# Patient Record
Sex: MALE | Race: ASIAN | URBAN - METROPOLITAN AREA
[De-identification: names, ages, dates, MRNs, and addresses within clinical notes are randomized per-mention and may not be internally consistent; named-entity substitution may affect disease eponyms.]

---

## 2017-06-21 ENCOUNTER — HOSPITAL ENCOUNTER (EMERGENCY)
Facility: CLINIC | Age: 32
Discharge: HOME | End: 2017-06-21
Attending: EMERGENCY MEDICINE | Admitting: EMERGENCY MEDICINE
Payer: COMMERCIAL

## 2017-06-21 VITALS
SYSTOLIC BLOOD PRESSURE: 146 MMHG | RESPIRATION RATE: 18 BRPM | DIASTOLIC BLOOD PRESSURE: 98 MMHG | HEIGHT: 70 IN | TEMPERATURE: 97.9 F | WEIGHT: 180 LBS | OXYGEN SATURATION: 99 % | HEART RATE: 94 BPM | BODY MASS INDEX: 25.77 KG/M2

## 2017-06-21 DIAGNOSIS — G43.809 OTHER MIGRAINE WITHOUT STATUS MIGRAINOSUS, NOT INTRACTABLE: ICD-10-CM

## 2017-06-21 PROCEDURE — 25000128 H RX IP 250 OP 636: Performed by: EMERGENCY MEDICINE

## 2017-06-21 PROCEDURE — 96374 THER/PROPH/DIAG INJ IV PUSH: CPT

## 2017-06-21 PROCEDURE — 96375 TX/PRO/DX INJ NEW DRUG ADDON: CPT

## 2017-06-21 PROCEDURE — 96361 HYDRATE IV INFUSION ADD-ON: CPT

## 2017-06-21 PROCEDURE — 99285 EMERGENCY DEPT VISIT HI MDM: CPT | Mod: 25

## 2017-06-21 PROCEDURE — 25000125 ZZHC RX 250: Performed by: EMERGENCY MEDICINE

## 2017-06-21 PROCEDURE — 96376 TX/PRO/DX INJ SAME DRUG ADON: CPT

## 2017-06-21 PROCEDURE — 25000308 HC RX OP HPI UCR WEL MED 250 IP 250: Performed by: EMERGENCY MEDICINE

## 2017-06-21 RX ORDER — DIHYDROERGOTAMINE MESYLATE 1 MG/ML
1 INJECTION, SOLUTION INTRAMUSCULAR; INTRAVENOUS; SUBCUTANEOUS ONCE
Status: COMPLETED | OUTPATIENT
Start: 2017-06-21 | End: 2017-06-21

## 2017-06-21 RX ORDER — SODIUM CHLORIDE 9 MG/ML
1000 INJECTION, SOLUTION INTRAVENOUS CONTINUOUS
Status: DISCONTINUED | OUTPATIENT
Start: 2017-06-21 | End: 2017-06-21 | Stop reason: HOSPADM

## 2017-06-21 RX ORDER — LIDOCAINE 40 MG/G
CREAM TOPICAL
Status: DISCONTINUED | OUTPATIENT
Start: 2017-06-21 | End: 2017-06-21 | Stop reason: HOSPADM

## 2017-06-21 RX ORDER — DIPHENHYDRAMINE HYDROCHLORIDE 50 MG/ML
50 INJECTION INTRAMUSCULAR; INTRAVENOUS ONCE
Status: COMPLETED | OUTPATIENT
Start: 2017-06-21 | End: 2017-06-21

## 2017-06-21 RX ORDER — KETOROLAC TROMETHAMINE 30 MG/ML
30 INJECTION, SOLUTION INTRAMUSCULAR; INTRAVENOUS ONCE
Status: COMPLETED | OUTPATIENT
Start: 2017-06-21 | End: 2017-06-21

## 2017-06-21 RX ORDER — LIDOCAINE HYDROCHLORIDE 40 MG/ML
1 INJECTION, SOLUTION RETROBULBAR ONCE
Status: COMPLETED | OUTPATIENT
Start: 2017-06-21 | End: 2017-06-21

## 2017-06-21 RX ORDER — LORAZEPAM 2 MG/ML
1 INJECTION INTRAMUSCULAR ONCE
Status: COMPLETED | OUTPATIENT
Start: 2017-06-21 | End: 2017-06-21

## 2017-06-21 RX ORDER — DEXAMETHASONE SODIUM PHOSPHATE 10 MG/ML
10 INJECTION, SOLUTION INTRAMUSCULAR; INTRAVENOUS ONCE
Status: COMPLETED | OUTPATIENT
Start: 2017-06-21 | End: 2017-06-21

## 2017-06-21 RX ADMIN — DEXAMETHASONE SODIUM PHOSPHATE 10 MG: 10 INJECTION, SOLUTION INTRAMUSCULAR; INTRAVENOUS at 14:37

## 2017-06-21 RX ADMIN — DIPHENHYDRAMINE HYDROCHLORIDE 50 MG: 50 INJECTION, SOLUTION INTRAMUSCULAR; INTRAVENOUS at 14:29

## 2017-06-21 RX ADMIN — PROCHLORPERAZINE EDISYLATE 10 MG: 5 INJECTION INTRAMUSCULAR; INTRAVENOUS at 14:31

## 2017-06-21 RX ADMIN — SODIUM CHLORIDE 1000 ML: 9 INJECTION, SOLUTION INTRAVENOUS at 14:28

## 2017-06-21 RX ADMIN — SODIUM CHLORIDE 1000 ML: 9 INJECTION, SOLUTION INTRAVENOUS at 15:47

## 2017-06-21 RX ADMIN — LIDOCAINE HYDROCHLORIDE 1 ML: 40 INJECTION, SOLUTION RETROBULBAR; TOPICAL at 17:07

## 2017-06-21 RX ADMIN — DIHYDROERGOTAMINE MESYLATE 1 MG: 1 INJECTION, SOLUTION INTRAMUSCULAR; INTRAVENOUS; SUBCUTANEOUS at 14:34

## 2017-06-21 RX ADMIN — LIDOCAINE HYDROCHLORIDE 1 ML: 10 INJECTION, SOLUTION EPIDURAL; INFILTRATION; INTRACAUDAL; PERINEURAL at 17:03

## 2017-06-21 RX ADMIN — LORAZEPAM 1 MG: 2 INJECTION INTRAMUSCULAR; INTRAVENOUS at 15:47

## 2017-06-21 RX ADMIN — DIHYDROERGOTAMINE MESYLATE 1 MG: 1 INJECTION, SOLUTION INTRAMUSCULAR; INTRAVENOUS; SUBCUTANEOUS at 15:47

## 2017-06-21 RX ADMIN — KETOROLAC TROMETHAMINE 30 MG: 30 INJECTION, SOLUTION INTRAMUSCULAR at 14:41

## 2017-06-21 ASSESSMENT — ENCOUNTER SYMPTOMS
PHOTOPHOBIA: 1
VOMITING: 0
HEADACHES: 1
NAUSEA: 0
FEVER: 0

## 2017-06-21 NOTE — ED AVS SNAPSHOT
Emergency Department    [address] [facility] [address]    Phone:  [phone]    Fax:  [phone]                                       Syd Renee   MRN: [id number]    Department:   Emergency Department   Date of Visit:  6/21/2017           After Visit Summary Signature Page     I have received my discharge instructions, and my questions have been answered. I have discussed any challenges I see with this plan with the nurse or doctor.    ..........................................................................................................................................  Patient/Patient Representative Signature      ..........................................................................................................................................  Patient Representative Print Name and Relationship to Patient    ..................................................               ................................................  Date                                            Time    ..........................................................................................................................................  Reviewed by Signature/Title    ...................................................              ..............................................  Date                                                            Time

## 2017-06-21 NOTE — DISCHARGE INSTRUCTIONS
Home, rest, try an ice pack to the head.  Recheck in the clinic this week with the neurologist this week or early next week.  If you get worse, return to the ER.        Migraines and Cluster Headaches  Migraines and cluster headaches cause intense, throbbing pain on one side of the head. With a migraine, you may have nausea and vomiting and be sensitive to light and sound. You may also have warning signs, such as flashing lights or loss of parts of your vision, before the pain starts. Migraines are three times more common in women than men. This may be due to hormonal changes during menstruation. Typical migrains may last for 4 to 72 hours untreated.  Cluster headaches recur in groups for days, weeks, or months. The pain is centered around or behind one eye. The eye may also become red or teary, or the eyelid may droop. Migraines and cluster headaches can have many triggers.    Preventing migraines and cluster headaches  Try the following steps:    Avoid aged cheeses, nuts, beans, chocolate, red wine, or foods that contain caffeine, alcohol, tobacco, nitrates, and MSG.    Try not to skip meals.    Don t work in poor lighting.    Reduce stress as much as you can.    Get plenty of sleep each night.    Exercise regularly under your doctor s guidance.    Avoid taking headache medicines for more than 3 days, because of the risk of rebound headaches.  Relieving the pain  Try these suggestions:    Stay quiet and rest.    Use cold to numb the pain. Wrap ice or a cold can of soda in a cloth. Hold it against the site of pain for 10 minutes. Repeat every 20 minutes.    Avoid light. Wear dark glasses, turn out lights, and close the curtains. When outdoors, wear a brimmed hat.    Drink lots of fluids. Sip caffeine-free flat soda to help relieve nausea.    See your doctor if you get migraines or cluster headaches often. There are effective medications to help treat or prevent them.    Hormone therapy. This may help women whose  migraines are related to hormonal changes during menstruation.  Date Last Reviewed: 10/19/2015    8138-3350 [employer]. [address] [place] [address], [place] [address]. All rights reserved. This information is not intended as a substitute for professional medical care. Always follow your healthcare professional's instructions.

## 2017-06-21 NOTE — ED PROVIDER NOTES
"  History     Chief Complaint:  Headache    HPI   Syd Renee is a 31 year old male who presents with a 9/10 headache. Patient reports he has had similar headaches before, most recently 2 years ago. He has seen neurology, and reports being diagnosed with cluster headaches. Patient's current headache began 2 days ago. It is a \"pulsating\" headache, and hurts worst on the front right and back right sides. The patient has taken tylenol with no relief. He is having a little trouble with light. No trauma to the head. Denies fevers, visual disturbances, or nausea/vomiting.     Allergies:  The patient has no known drug allergies.    Medications:    Norco  Ibuprofen  Ceftin  Phenergan    Past Medical History:    Cluster headaches    Past Surgical History:    History reviewed.  No significant past surgical history.     Family History:    History reviewed.  No significant family history.    Social History:  Relationship status:   Tobacco use: Negative  Alcohol use: Positive  The patient presents with his wife.     Review of Systems   Constitutional: Negative for fever.   Eyes: Positive for photophobia. Negative for visual disturbance.   Gastrointestinal: Negative for nausea and vomiting.   Neurological: Positive for headaches.   All other systems reviewed and are negative.      Physical Exam   First Vitals:  BP: (!) 157/95  Pulse: 95  Temp: 97.9  F (36.6  C)  Resp: 20  Height: 177.8 cm (5' 10\")  Weight: 81.6 kg (180 lb)  SpO2: 96 %    Physical Exam  Nursing note and vitals reviewed.  Constitutional:  Appears well-developed and well-nourished, comfortable.   HENT:    Neck is supple.  Head:   No evidence of facial or scalp trauma.  Nose:    Nose normal.   Mouth/Throat:  Mucosa is moist.  Eyes:    Conjunctivae are normal.      Pupils are equal, round, and reactive to light.      Right eye exhibits no discharge. Left eye exhibits no discharge.      No scleral icterus.   Cardiovascular:  Normal rate, regular rhythm. "      Normal heart sounds and intact distal pulses.       No murmur heard.  Pulmonary/Chest:  Effort normal and breath sounds normal. No respiratory distress.     No wheezes. No rales. No chest wall tenderness. No stridor.   Abdominal:   Soft. No distension and no mass. No tenderness.      No rebound and no guarding. No flank pain.  Musculoskeletal:  Normal range of motion.      No edema and no tenderness.                                       Neck supple, no midline cervical tenderness.   Neurological:   Alert and oriented to person, place, and year.      No cranial nerve deficit.      Exhibits normal muscle tone. Coordination normal.      GCS eye subscore is 4. GCS verbal subscore is 5.      GCS motor subscore is 6.      No hand drift. Good  strength.  Skin:    Skin is warm and dry. No rash noted. No diaphoresis.      No erythema. No pallor.   Psychiatric:   Behavior is normal. Judgment and thought content normal.       Emergency Department Course     Interventions:  1428: Normal Saline, 1000 mL, IV  1429: Benadryl, 50 mg, IV injection  1431: Compazine, 10 mg, IV  1434: DHE, 1 mg, IV  1437: Decadron, 10 mg, IV injection  1441: Toradol, 30 mg, IV injection  1547: DHE, 1 mg, IV  1547: Ativan, 1.0 mg, IV injection  1547: Normal Saline, 1000 mL, IV  1707: Lidocaine injection, 1 mL, Nasal    Emergency Department Course:  Nursing notes and vitals reviewed.  I performed an exam of the patient as documented above.  The above workup was undertaken.  1520: I rechecked the patient.  1610: I rechecked the patient.    Findings and plan explained to the Patient. Patient discharged home, status improved, with instructions regarding supportive care, medications, and reasons to return as well as the importance of close follow-up was reviewed.      Impression & Plan      Medical Decision Making:  Syd Renee is a 31 year old male who comes in with what he calls a cluster headache, though he does not have the rhinorrhea, or the  eye watering, or sweating. I suspect this is probably a vascular headache, like a migraine. IV was placed. He was given fluids, and multiple medications. He was given Toradol, decadron, benadryl, compazine, and DHE IV. After an hour, his headache was down to 6-7. He was given a second dose of DHE 1 mg IV, and 1 mg of ativan. After another 45-60 minutes, his headache was down to 2-3. He feels much better and is ready to go home. I want him to get into neurology. At this point, he will go home and rest. He will call the clinic to be seen. If he gets significantly worse, he is welcome to return to the ED. No history of trauma, no fevers, no reason for further headache workup. It is consistent with his previous vascular headaches.     Diagnosis:    ICD-10-CM   1. Other migraine without status migrainosus, not intractable G43.809     Disposition:  Discharge to home with neurology follow up. Home, rest, try an ice pack to the head.  Recheck in the clinic this week with the neurologist this week or early next week.  If you get worse, return to the ER.    I, Clark Hernandez, am serving as a scribe on 6/21/2017 at 2:07 PM to personally document services performed by Teresa Carson MD, based on my observations and the provider's statements to me.     EMERGENCY DEPARTMENT       Teresa Carson MD  06/21/17 7836

## 2017-06-24 ENCOUNTER — HOSPITAL ENCOUNTER (EMERGENCY)
Facility: CLINIC | Age: 32
Discharge: HOME | End: 2017-06-24
Attending: EMERGENCY MEDICINE | Admitting: EMERGENCY MEDICINE
Payer: COMMERCIAL

## 2017-06-24 VITALS
SYSTOLIC BLOOD PRESSURE: 142 MMHG | HEIGHT: 70 IN | RESPIRATION RATE: 20 BRPM | BODY MASS INDEX: 25.77 KG/M2 | OXYGEN SATURATION: 100 % | TEMPERATURE: 98.6 F | WEIGHT: 180 LBS | DIASTOLIC BLOOD PRESSURE: 90 MMHG | HEART RATE: 80 BPM

## 2017-06-24 DIAGNOSIS — G44.001 INTRACTABLE CLUSTER HEADACHE SYNDROME, UNSPECIFIED CHRONICITY PATTERN: ICD-10-CM

## 2017-06-24 PROCEDURE — 25000128 H RX IP 250 OP 636: Performed by: EMERGENCY MEDICINE

## 2017-06-24 PROCEDURE — 96361 HYDRATE IV INFUSION ADD-ON: CPT

## 2017-06-24 PROCEDURE — 96374 THER/PROPH/DIAG INJ IV PUSH: CPT

## 2017-06-24 PROCEDURE — 99284 EMERGENCY DEPT VISIT MOD MDM: CPT | Mod: 25

## 2017-06-24 PROCEDURE — 96375 TX/PRO/DX INJ NEW DRUG ADDON: CPT

## 2017-06-24 RX ORDER — DIPHENHYDRAMINE HYDROCHLORIDE 50 MG/ML
25 INJECTION INTRAMUSCULAR; INTRAVENOUS ONCE
Status: COMPLETED | OUTPATIENT
Start: 2017-06-24 | End: 2017-06-24

## 2017-06-24 RX ORDER — DEXAMETHASONE SODIUM PHOSPHATE 10 MG/ML
6 INJECTION, SOLUTION INTRAMUSCULAR; INTRAVENOUS ONCE
Status: COMPLETED | OUTPATIENT
Start: 2017-06-24 | End: 2017-06-24

## 2017-06-24 RX ORDER — KETOROLAC TROMETHAMINE 15 MG/ML
15 INJECTION, SOLUTION INTRAMUSCULAR; INTRAVENOUS ONCE
Status: COMPLETED | OUTPATIENT
Start: 2017-06-24 | End: 2017-06-24

## 2017-06-24 RX ORDER — METOCLOPRAMIDE HYDROCHLORIDE 5 MG/ML
10 INJECTION INTRAMUSCULAR; INTRAVENOUS ONCE
Status: COMPLETED | OUTPATIENT
Start: 2017-06-24 | End: 2017-06-24

## 2017-06-24 RX ORDER — PREDNISONE 20 MG/1
TABLET ORAL
Qty: 10 TABLET | Refills: 0 | Status: SHIPPED | OUTPATIENT
Start: 2017-06-24

## 2017-06-24 RX ADMIN — DEXAMETHASONE SODIUM PHOSPHATE 6 MG: 10 INJECTION, SOLUTION INTRAMUSCULAR; INTRAVENOUS at 09:56

## 2017-06-24 RX ADMIN — METOCLOPRAMIDE 10 MG: 5 INJECTION, SOLUTION INTRAMUSCULAR; INTRAVENOUS at 09:57

## 2017-06-24 RX ADMIN — DIPHENHYDRAMINE HYDROCHLORIDE 25 MG: 50 INJECTION, SOLUTION INTRAMUSCULAR; INTRAVENOUS at 09:55

## 2017-06-24 RX ADMIN — SODIUM CHLORIDE 1000 ML: 9 INJECTION, SOLUTION INTRAVENOUS at 09:53

## 2017-06-24 RX ADMIN — KETOROLAC TROMETHAMINE 15 MG: 15 INJECTION, SOLUTION INTRAMUSCULAR; INTRAVENOUS at 09:59

## 2017-06-24 ASSESSMENT — ENCOUNTER SYMPTOMS
DIZZINESS: 0
HEADACHES: 1
WEAKNESS: 0
NECK PAIN: 1
NECK STIFFNESS: 0
FEVER: 0
PHOTOPHOBIA: 1
NUMBNESS: 0
GASTROINTESTINAL NEGATIVE: 1
CHILLS: 0

## 2017-06-24 NOTE — ED PROVIDER NOTES
"  History     Chief Complaint:  Headache     HPI   Syd Renee is a 31 year old male with a history of cluster headaches who presents for evaluation of a one week history of a right fronto-temporal and occipital headache. This is exactly reminiscent of prior cluster headache he had in 2015, at which time he had unremarkable MRI/MRA studies, and is associated with mild photophobia and insomnia. The patient was seen here three days ago for this same headache where he had improvement with interventions while in the ED, but recurrence after discharge (see Dr. Carson's note on 6/21/17 for further details). He has an appointment scheduled with Dr. Kwok of Neurology in two days, but his headache has been intractable since his previous visit. He reports improvement with oral steroids two years ago with initial headaches, and requests these again today. He does endorse mild neck pain without neck stiffness. The patient denies any focal numbness or weakness, nausea or vomiting, recent falls or head trauma, neck stiffness, fever or chills, rash, recent tick/insect bites, or any other acute symptoms. Headache is currently \"10/10\" and not improved with acetaminophen at home.       Allergies:  NKDA    Medications:    Norco  ibuprofen  Ceftin  Phenergan     Past Medical History:    Cluster headaches    Past Surgical History:    History reviewed. No pertinent surgical history.     Family History:    History reviewed. No pertinent family history.      Social History:  The patient denies tobacco, alcohol, or drug use.    Marital Status:   [2]       Review of Systems   Constitutional: Negative for chills and fever.   Eyes: Positive for photophobia.   Gastrointestinal: Negative.    Musculoskeletal: Positive for neck pain. Negative for neck stiffness.   Neurological: Positive for headaches. Negative for dizziness, weakness and numbness.   All other systems reviewed and are negative.      Physical Exam     Patient Vitals for " "the past 24 hrs:   BP Temp Temp src Pulse Heart Rate Resp SpO2 Height Weight   17 1052 142/90 - - 80 - 20 100 % - -   17 1030 (!) 143/91 - - - - - 100 % - -   17 1015 146/86 - - - - - 100 % - -   17 0936 (!) 144/119 98.6  F (37  C) Oral - 83 12 97 % 1.778 m (5' 10\") 81.6 kg (180 lb)      Physical Exam  General: Patient is alert and normal appearing.  HEENT: Head atraumatic    Eyes: pupils equal and reactive. Conjunctiva clear   Nares: patent   Oropharynx: no lesions, uvula midline, no palatal draping, normal voice, no trismus  Neck: Supple without lymphadenopathy, no meningismus  Chest: Heart regular rate and rhythm.   Lungs: Equal clear to auscultation with no wheeze or rales  Abdomen: Soft, non tender, nondistended, normal bowel sounds  Back: No costovertebral angle tenderness, no midline C, T or L spine tenderness  Neuro: Grossly nonfocal, normal speech, strength equal bilaterally, CN 2-12 intact, normal finger to nose, no pronator drift  Extremities: No deformities, equal radial and DP pulses. No clubbing, cyanosis.  No edema  Skin: Warm and dry with no rash.       Emergency Department Course   Interventions:  0953: Normal Saline 0.9% 1L, IV   0955: diphenhydramine 25mg,IV  0956: dexamethasone 6mg, IV  0957: metoclopramide 10mg, IV  0959: ketorolac 15mg, IV    Emergency Department Course:  Past medical records, nursing notes, and vitals reviewed.  0942: I performed an exam of the patient as documented above. GCS 15.   IV access established. The patient was given the above interventions with improvement.   1030: Rechecked patient. His headache is now 4/10 and much improved.   Clinical findings and plan explained to the Patient and significant other. Patient discharged home with instructions regarding supportive care, medications, and reasons to return as well as the importance of close follow-up were reviewed.      Impression & Plan    Medical Decision Makin year old male with a " history of cluster headache -- last was two years ago and felt identical to this headache. Started one week ago today. He was seen here on Wednesday and had improvement in headache that day and was able to sleep, but the last few nights have been severe pain. He states it is identical severe headaches and not improved with tylenol at home. He had an MRI last two years ago. He has had no fevers or chills. He has had some neck pain but no stiffness. No tick bite exposures. I do not suspect meningitis at this point. I do not feel this is consistent with acute subarachnoid hemorrhage. I do not feel that new neuro imaging or LP are necessary at this time. He was given headache cocktail again today with Toradol, benadryl, Reglan, and decadron with significant improvement in his headache. He is recommended to follow up with Dr. Kwok with Neurology on Monday as already scheduled on Monday to further discuss this headache. Had this been a new or different headache I would have elected to do further workup but he, again, admits that this is identical.  He is really here requesting prednisone as this is what helped his headache the last time. He does not wish for CT imaging at this time and understands the possibility of missed CNS lesion.  Headache was gradual in onset, has been present over one week and does not seem consistent with SAH or meningitis at this time clinically. The patient's questions and concerns were addressed. He has had no recent head injury to suggest intracranial injury. Vitals are within normal limits. The patient is agreeable to management plan, return precautions to the ER were reviewed.   Critical Care time:  none    Diagnosis:    ICD-10-CM    1. Intractable cluster headache syndrome, unspecified chronicity pattern G44.001        Disposition:  discharged to home    Discharge Medications:  Discharge Medication List as of 6/24/2017 10:47 AM      START taking these medications    Details   predniSONE  (DELTASONE) 20 MG tablet Take two tablets (= 40mg) each day for 5 (five) days, Disp-10 tablet, R-0, Local Print               Nino Clifton  6/24/2017    EMERGENCY DEPARTMENT       Winter Richmond MD  06/24/17 5424

## 2017-06-24 NOTE — ED AVS SNAPSHOT
Emergency Department    [address] [facility] [address]    Phone:  [phone]    Fax:  [phone]                                       Syd Renee   MRN: [id number]    Department:   Emergency Department   Date of Visit:  6/24/2017           Patient Information     Date Of Birth          1985        Your diagnoses for this visit were:     Intractable cluster headache syndrome, unspecified chronicity pattern        You were seen by Winter Richmond MD.      Follow-up Information     Follow up with Kaur Grewal MD.    Specialty:  Neurology    Why:  as scheduled on Monday    Contact information:    [facility]  [address] [address] [phone]          Follow up with  Emergency Department.    Specialty:  EMERGENCY MEDICINE    Why:  If symptoms worsen, fever, neck stiffness or other concerns    Contact information:    [address] [facility] [id number] [phone]        Discharge Instructions         Cluster Headache  A cluster headache is different from a migraine or a tension headache. A cluster headache starts suddenly, without any warning. The pain can become severe within minutes. The headache is often brief. It can last only 15 minutes. But it can also continue for several hours.  The pain is around a single eye. You may have tears coming from that eye. That eyelid may become droopy. The eye may be red and swollen. You may also have a stuffy or runny nose on the affected side.  You may also have several headaches in one 24-hour period. These may return at the same time of day during the cluster period. A cluster headache ends as suddenly as it began. It often leaves you feeling exhausted for some time afterward.  Cluster headaches often occur at night and during certain times of the year that are unique to you. A cluster period ranges from a few weeks up to a few months. Then, the headaches may not come back  for months or years.  Possible triggers include alcohol and smoking. Even a single alcoholic drink can trigger a headache during the cluster period. Another potential trigger is interrupted sleep patterns. Medicines like nitroglycerin can also cause a cluster headache.  Treatment for cluster headaches varies. Headaches that last only 15 minutes aren t helped by over-the-counter medicines. That s because these medicines take 20 to 30 minutes to start working. Prescription medicines given by injection or as a nasal spray can stop a headache. This is called abortive treatment.  Preventive medicines include steroids and anti-seizure medicines. These can help cut the number of headaches you have during a cluster period. High dose oxygen therapy or a nerve stimulator may shorten each attack and make it less severe. If you have cluster headaches often or have severe symptoms, your healthcare provider may talk with you about surgery. Surgery may be able to stop the nerve that s sending the pain signals. These types of treatments are usually given by a specialist (neurologist or neurosurgeon).  Home care  Follow these tips when caring for yourself at home:    Don t drive yourself home if you were given pain medicine for your headache. Instead, have someone else drive you home. Try to sleep when you get home. You should feel much better when you wake up.    If your healthcare provider gave you preventive medicines, take them as directed. If abortive medicines were prescribed, carry these with you to take at first sign of the headache.    Stick to a regular sleep schedule. Avoid afternoon naps during a cluster period. If you have sleep apnea, talk with your provider about getting treatment for this condition. Sleep apnea greatly interferes with sleep patterns.    Stay away from gas fumes and oil-based paint fumes.    Avoid drinking alcohol and smoking during a cluster period.    Be cautious when traveling to high altitudes.  Higher altitudes have less oxygen. This may trigger a headache.    Use sunglasses to avoid glare and bright lights.    Keep a headache journal. Record the date and time of each headache. Describe the quality of the pain. Note how severe it is, where it is, and how long it lasts. Write down your response to any medicines to control the pain. Note possible triggers: Was it something you ate? Something you did just before the attack? Share this information with your provider to help him or her figure out the best treatment plan for you.    Talk with a counselor or therapist, or join a support group. This may help you cope with the effects of cluster headache on your life.  Follow-up care  Follow up with your healthcare provider, or as advised If you had a CT scan or an MRI, a specialist will review it. You will be told of any new findings that may affect your care.  When to seek medical advice  Call your healthcare provider right away if any of these occur:    Sudden, severe headache, worse than any you have had before    Headache with a fainting spell    Unexplained fever greater than 100.0  F (37.8  C), or as advised    Stiff neck or rash    Weakness in an arm or leg, or on one side of your face    Difficulty speaking    Headaches brought on by physical activity    Changes in your vision    You need to use more pain medicine than normal  Date Last Reviewed: 8/1/2016 2000-2017 [employer]. [address] [place], [place] [address]. All rights reserved. This information is not intended as a substitute for professional medical care. Always follow your healthcare professional's instructions.          24 Hour Appointment Hotline       To make an appointment at any [facility] clinic, call [phone] ([phone]). If you don't have a family doctor or clinic, we will help you find one. [facility] clinics are conveniently located to serve the needs of you and your family.             Review of your  medicines      START taking        Dose / Directions Last dose taken    predniSONE 20 MG tablet   Commonly known as:  DELTASONE   Quantity:  10 tablet        Take two tablets (= 40mg) each day for 5 (five) days   Refills:  0          Our records show that you are taking the medicines listed below. If these are incorrect, please call your family doctor or clinic.        Dose / Directions Last dose taken    cefuroxime 250 MG/5ML suspension   Commonly known as:  CEFTIN   Dose:  500 mg        Take 500 mg by mouth 2 times daily   Refills:  0        HYDROcodone-acetaminophen 5-325 MG per tablet   Commonly known as:  NORCO   Dose:  1-2 tablet   Quantity:  15 tablet        Take 1-2 tablets by mouth every 4 hours as needed for pain   Refills:  0        IBUPROFEN PO        Refills:  0        promethazine 25 MG tablet   Commonly known as:  PHENERGAN   Dose:  12.5 mg   Quantity:  10 tablet        Take 0.5 tablets (12.5 mg) by mouth every 6 hours as needed for nausea or other (headache)   Refills:  1                Prescriptions were sent or printed at these locations (1 Prescription)                   Other Prescriptions                Printed at Department/Unit printer (1 of 1)         predniSONE (DELTASONE) 20 MG tablet                Orders Needing Specimen Collection     None      Pending Results     No orders found from 6/22/2017 to 6/25/2017.            Pending Culture Results     No orders found from 6/22/2017 to 6/25/2017.            Pending Results Instructions     If you had any lab results that were not finalized at the time of your Discharge, you can call the ED Lab Result RN at [phone]. You will be contacted by this team for any positive Lab results or changes in treatment. The nurses are available 7 days a week from 10A to 6:30P.  You can leave a message 24 hours per day and they will return your call.        Test Results From Your Hospital Stay               Clinical Quality Measure: Blood Pressure Screening  "    Your blood pressure was checked while you were in the emergency department today. The last reading we obtained was  BP: (!) 143/91 . Please read the guidelines below about what these numbers mean and what you should do about them.  If your systolic blood pressure (the top number) is less than 120 and your diastolic blood pressure (the bottom number) is less than 80, then your blood pressure is normal. There is nothing more that you need to do about it.  If your systolic blood pressure (the top number) is 120-139 or your diastolic blood pressure (the bottom number) is 80-89, your blood pressure may be higher than it should be. You should have your blood pressure rechecked within a year by a primary care provider.  If your systolic blood pressure (the top number) is 140 or greater or your diastolic blood pressure (the bottom number) is 90 or greater, you may have high blood pressure. High blood pressure is treatable, but if left untreated over time it can put you at risk for heart attack, stroke, or kidney failure. You should have your blood pressure rechecked by a primary care provider within the next 4 weeks.  If your provider in the emergency department today gave you specific instructions to follow-up with your doctor or provider even sooner than that, you should follow that instruction and not wait for up to 4 weeks for your follow-up visit.        Thank you for choosing [facility]       Thank you for choosing [facility] for your care. Our goal is always to provide you with excellent care. Hearing back from our patients is one way we can continue to improve our services. Please take a few minutes to complete the written survey that you may receive in the mail after you visit with us. Thank you!        [employer] Information     [employer] lets you send messages to your doctor, view your test results, renew your prescriptions, schedule appointments and more. To sign up, go to [email or web address] . Click on \"Log in\" " "on the left side of the screen, which will take you to the Welcome page. Then click on \"Sign up Now\" on the right side of the page.     You will be asked to enter the access code listed below, as well as some personal information. Please follow the directions to create your username and password.     Your access code is: [email or web address]  Expires: 2017  4:15 PM     Your access code will  in 90 days. If you need help or a new code, please call your [facility] clinic or [phone].        Care EveryWhere ID     This is your Care EveryWhere ID. This could be used by other organizations to access your [facility] medical records  [id number]        Equal Access to Services     PATRICIA WHITLOCK : Yenny Weaver, akil rapp, emilio doshi, gaurav milligan. So [facility] [phone].    ATENCIÓN: Si habla español, tiene a parr disposición servicios gratuitos de asistencia lingüística. Llame al [phone].    We comply with applicable federal civil rights laws and [place] laws. We do not discriminate on the basis of race, color, national origin, age, disability sex, sexual orientation or gender identity.            After Visit Summary       This is your record. Keep this with you and show to your community pharmacist(s) and doctor(s) at your next visit.                  "

## 2017-06-24 NOTE — ED AVS SNAPSHOT
Emergency Department    [address] [facility] [address]    Phone:  [phone]    Fax:  [phone]                                       Syd Renee   MRN: [id number]    Department:   Emergency Department   Date of Visit:  6/24/2017           After Visit Summary Signature Page     I have received my discharge instructions, and my questions have been answered. I have discussed any challenges I see with this plan with the nurse or doctor.    ..........................................................................................................................................  Patient/Patient Representative Signature      ..........................................................................................................................................  Patient Representative Print Name and Relationship to Patient    ..................................................               ................................................  Date                                            Time    ..........................................................................................................................................  Reviewed by Signature/Title    ...................................................              ..............................................  Date                                                            Time

## 2017-06-24 NOTE — DISCHARGE INSTRUCTIONS
Cluster Headache  A cluster headache is different from a migraine or a tension headache. A cluster headache starts suddenly, without any warning. The pain can become severe within minutes. The headache is often brief. It can last only 15 minutes. But it can also continue for several hours.  The pain is around a single eye. You may have tears coming from that eye. That eyelid may become droopy. The eye may be red and swollen. You may also have a stuffy or runny nose on the affected side.  You may also have several headaches in one 24-hour period. These may return at the same time of day during the cluster period. A cluster headache ends as suddenly as it began. It often leaves you feeling exhausted for some time afterward.  Cluster headaches often occur at night and during certain times of the year that are unique to you. A cluster period ranges from a few weeks up to a few months. Then, the headaches may not come back for months or years.  Possible triggers include alcohol and smoking. Even a single alcoholic drink can trigger a headache during the cluster period. Another potential trigger is interrupted sleep patterns. Medicines like nitroglycerin can also cause a cluster headache.  Treatment for cluster headaches varies. Headaches that last only 15 minutes aren t helped by over-the-counter medicines. That s because these medicines take 20 to 30 minutes to start working. Prescription medicines given by injection or as a nasal spray can stop a headache. This is called abortive treatment.  Preventive medicines include steroids and anti-seizure medicines. These can help cut the number of headaches you have during a cluster period. High dose oxygen therapy or a nerve stimulator may shorten each attack and make it less severe. If you have cluster headaches often or have severe symptoms, your healthcare provider may talk with you about surgery. Surgery may be able to stop the nerve that s sending the pain  signals. These types of treatments are usually given by a specialist (neurologist or neurosurgeon).  Home care  Follow these tips when caring for yourself at home:    Don t drive yourself home if you were given pain medicine for your headache. Instead, have someone else drive you home. Try to sleep when you get home. You should feel much better when you wake up.    If your healthcare provider gave you preventive medicines, take them as directed. If abortive medicines were prescribed, carry these with you to take at first sign of the headache.    Stick to a regular sleep schedule. Avoid afternoon naps during a cluster period. If you have sleep apnea, talk with your provider about getting treatment for this condition. Sleep apnea greatly interferes with sleep patterns.    Stay away from gas fumes and oil-based paint fumes.    Avoid drinking alcohol and smoking during a cluster period.    Be cautious when traveling to high altitudes. Higher altitudes have less oxygen. This may trigger a headache.    Use sunglasses to avoid glare and bright lights.    Keep a headache journal. Record the date and time of each headache. Describe the quality of the pain. Note how severe it is, where it is, and how long it lasts. Write down your response to any medicines to control the pain. Note possible triggers: Was it something you ate? Something you did just before the attack? Share this information with your provider to help him or her figure out the best treatment plan for you.    Talk with a counselor or therapist, or join a support group. This may help you cope with the effects of cluster headache on your life.  Follow-up care  Follow up with your healthcare provider, or as advised If you had a CT scan or an MRI, a specialist will review it. You will be told of any new findings that may affect your care.  When to seek medical advice  Call your healthcare provider right away if any of these occur:    Sudden, severe headache, worse  than any you have had before    Headache with a fainting spell    Unexplained fever greater than 100.0  F (37.8  C), or as advised    Stiff neck or rash    Weakness in an arm or leg, or on one side of your face    Difficulty speaking    Headaches brought on by physical activity    Changes in your vision    You need to use more pain medicine than normal  Date Last Reviewed: 8/1/2016 2000-2017 [employer]. [address] [place] [address], [place] [address]. All rights reserved. This information is not intended as a substitute for professional medical care. Always follow your healthcare professional's instructions.

## 2017-06-25 ENCOUNTER — APPOINTMENT (OUTPATIENT)
Dept: CT IMAGING | Facility: CLINIC | Age: 32
End: 2017-06-25
Attending: EMERGENCY MEDICINE
Payer: COMMERCIAL

## 2017-06-25 ENCOUNTER — HOSPITAL ENCOUNTER (EMERGENCY)
Facility: CLINIC | Age: 32
Discharge: HOME | End: 2017-06-25
Attending: EMERGENCY MEDICINE | Admitting: EMERGENCY MEDICINE
Payer: COMMERCIAL

## 2017-06-25 VITALS
TEMPERATURE: 98.4 F | RESPIRATION RATE: 16 BRPM | DIASTOLIC BLOOD PRESSURE: 70 MMHG | BODY MASS INDEX: 25.77 KG/M2 | HEIGHT: 70 IN | OXYGEN SATURATION: 100 % | WEIGHT: 180 LBS | SYSTOLIC BLOOD PRESSURE: 127 MMHG

## 2017-06-25 DIAGNOSIS — G44.011 INTRACTABLE EPISODIC CLUSTER HEADACHE: ICD-10-CM

## 2017-06-25 LAB
ANION GAP SERPL CALCULATED.3IONS-SCNC: 8 MMOL/L (ref 3–14)
APPEARANCE CSF: CLEAR
BASOPHILS # BLD AUTO: 0 10E9/L (ref 0–0.2)
BASOPHILS NFR BLD AUTO: 0.1 %
BUN SERPL-MCNC: 8 MG/DL (ref 7–30)
CALCIUM SERPL-MCNC: 9.3 MG/DL (ref 8.5–10.1)
CHLORIDE SERPL-SCNC: 103 MMOL/L (ref 94–109)
CO2 SERPL-SCNC: 25 MMOL/L (ref 20–32)
COLOR CSF: COLORLESS
CREAT SERPL-MCNC: 0.84 MG/DL (ref 0.66–1.25)
CRP SERPL-MCNC: <2.9 MG/L (ref 0–8)
DIFFERENTIAL METHOD BLD: ABNORMAL
EOSINOPHIL # BLD AUTO: 0 10E9/L (ref 0–0.7)
EOSINOPHIL NFR BLD AUTO: 0 %
ERYTHROCYTE [DISTWIDTH] IN BLOOD BY AUTOMATED COUNT: 13.3 % (ref 10–15)
ERYTHROCYTE [SEDIMENTATION RATE] IN BLOOD BY WESTERGREN METHOD: 2 MM/H (ref 0–15)
GFR SERPL CREATININE-BSD FRML MDRD: ABNORMAL ML/MIN/1.7M2
GLUCOSE CSF-MCNC: 78 MG/DL (ref 40–70)
GLUCOSE SERPL-MCNC: 115 MG/DL (ref 70–99)
GRAM STN SPEC: NORMAL
HCT VFR BLD AUTO: 48.2 % (ref 40–53)
HGB BLD-MCNC: 17.4 G/DL (ref 13.3–17.7)
IMM GRANULOCYTES # BLD: 0.1 10E9/L (ref 0–0.4)
IMM GRANULOCYTES NFR BLD: 0.5 %
INR PPP: 1.02 (ref 0.86–1.14)
LYMPHOCYTES # BLD AUTO: 0.8 10E9/L (ref 0.8–5.3)
LYMPHOCYTES NFR BLD AUTO: 5.4 %
MCH RBC QN AUTO: 29.2 PG (ref 26.5–33)
MCHC RBC AUTO-ENTMCNC: 36.1 G/DL (ref 31.5–36.5)
MCV RBC AUTO: 81 FL (ref 78–100)
MICRO REPORT STATUS: NORMAL
MONOCYTES # BLD AUTO: 0.6 10E9/L (ref 0–1.3)
MONOCYTES NFR BLD AUTO: 4 %
NEUTROPHILS # BLD AUTO: 13.3 10E9/L (ref 1.6–8.3)
NEUTROPHILS NFR BLD AUTO: 90 %
NRBC # BLD AUTO: 0 10*3/UL
NRBC BLD AUTO-RTO: 0 /100
PLATELET # BLD AUTO: 465 10E9/L (ref 150–450)
POTASSIUM SERPL-SCNC: 4 MMOL/L (ref 3.4–5.3)
PROT CSF-MCNC: 23 MG/DL (ref 15–60)
RBC # BLD AUTO: 5.96 10E12/L (ref 4.4–5.9)
RBC # CSF MANUAL: 0 /UL (ref 0–2)
SODIUM SERPL-SCNC: 136 MMOL/L (ref 133–144)
SPECIMEN SOURCE: NORMAL
TUBE # CSF: 4 #
WBC # BLD AUTO: 14.8 10E9/L (ref 4–11)
WBC # CSF MANUAL: 0 /UL (ref 0–5)

## 2017-06-25 PROCEDURE — 80048 BASIC METABOLIC PNL TOTAL CA: CPT | Performed by: EMERGENCY MEDICINE

## 2017-06-25 PROCEDURE — 70450 CT HEAD/BRAIN W/O DYE: CPT

## 2017-06-25 PROCEDURE — 86140 C-REACTIVE PROTEIN: CPT | Performed by: EMERGENCY MEDICINE

## 2017-06-25 PROCEDURE — 25000128 H RX IP 250 OP 636: Performed by: EMERGENCY MEDICINE

## 2017-06-25 PROCEDURE — 87205 SMEAR GRAM STAIN: CPT | Performed by: EMERGENCY MEDICINE

## 2017-06-25 PROCEDURE — 86617 LYME DISEASE ANTIBODY: CPT | Performed by: EMERGENCY MEDICINE

## 2017-06-25 PROCEDURE — 85610 PROTHROMBIN TIME: CPT | Performed by: EMERGENCY MEDICINE

## 2017-06-25 PROCEDURE — 99285 EMERGENCY DEPT VISIT HI MDM: CPT | Mod: 25

## 2017-06-25 PROCEDURE — 87070 CULTURE OTHR SPECIMN AEROBIC: CPT | Performed by: EMERGENCY MEDICINE

## 2017-06-25 PROCEDURE — 25000125 ZZHC RX 250: Performed by: EMERGENCY MEDICINE

## 2017-06-25 PROCEDURE — 85652 RBC SED RATE AUTOMATED: CPT | Performed by: EMERGENCY MEDICINE

## 2017-06-25 PROCEDURE — 96365 THER/PROPH/DIAG IV INF INIT: CPT

## 2017-06-25 PROCEDURE — 85025 COMPLETE CBC W/AUTO DIFF WBC: CPT | Performed by: EMERGENCY MEDICINE

## 2017-06-25 PROCEDURE — 87529 HSV DNA AMP PROBE: CPT | Performed by: EMERGENCY MEDICINE

## 2017-06-25 PROCEDURE — 89050 BODY FLUID CELL COUNT: CPT | Performed by: EMERGENCY MEDICINE

## 2017-06-25 PROCEDURE — 96375 TX/PRO/DX INJ NEW DRUG ADDON: CPT

## 2017-06-25 PROCEDURE — 96361 HYDRATE IV INFUSION ADD-ON: CPT

## 2017-06-25 PROCEDURE — 62270 DX LMBR SPI PNXR: CPT

## 2017-06-25 PROCEDURE — 82945 GLUCOSE OTHER FLUID: CPT | Performed by: EMERGENCY MEDICINE

## 2017-06-25 PROCEDURE — 84157 ASSAY OF PROTEIN OTHER: CPT | Performed by: EMERGENCY MEDICINE

## 2017-06-25 RX ORDER — HALOPERIDOL 5 MG/ML
2 INJECTION INTRAMUSCULAR ONCE
Status: COMPLETED | OUTPATIENT
Start: 2017-06-25 | End: 2017-06-25

## 2017-06-25 RX ORDER — HYDROCODONE BITARTRATE AND ACETAMINOPHEN 5; 325 MG/1; MG/1
1-2 TABLET ORAL EVERY 4 HOURS PRN
Qty: 15 TABLET | Refills: 0 | Status: ON HOLD | OUTPATIENT
Start: 2017-06-25 | End: 2017-10-05

## 2017-06-25 RX ORDER — DIPHENHYDRAMINE HYDROCHLORIDE 50 MG/ML
25 INJECTION INTRAMUSCULAR; INTRAVENOUS ONCE
Status: COMPLETED | OUTPATIENT
Start: 2017-06-25 | End: 2017-06-25

## 2017-06-25 RX ORDER — METOCLOPRAMIDE HYDROCHLORIDE 5 MG/ML
10 INJECTION INTRAMUSCULAR; INTRAVENOUS ONCE
Status: COMPLETED | OUTPATIENT
Start: 2017-06-25 | End: 2017-06-25

## 2017-06-25 RX ORDER — LIDOCAINE HYDROCHLORIDE 40 MG/ML
10 SOLUTION TOPICAL ONCE
Status: COMPLETED | OUTPATIENT
Start: 2017-06-25 | End: 2017-06-25

## 2017-06-25 RX ORDER — ONDANSETRON 2 MG/ML
4 INJECTION INTRAMUSCULAR; INTRAVENOUS EVERY 30 MIN PRN
Status: DISCONTINUED | OUTPATIENT
Start: 2017-06-25 | End: 2017-06-25 | Stop reason: HOSPADM

## 2017-06-25 RX ADMIN — DIPHENHYDRAMINE HYDROCHLORIDE 25 MG: 50 INJECTION, SOLUTION INTRAMUSCULAR; INTRAVENOUS at 11:59

## 2017-06-25 RX ADMIN — HALOPERIDOL LACTATE 2 MG: 5 INJECTION, SOLUTION INTRAMUSCULAR at 14:03

## 2017-06-25 RX ADMIN — VALPROATE SODIUM 500 MG: 100 INJECTION, SOLUTION INTRAVENOUS at 14:24

## 2017-06-25 RX ADMIN — LIDOCAINE HYDROCHLORIDE 10 ML: 40 SOLUTION TOPICAL at 14:00

## 2017-06-25 RX ADMIN — HYDROMORPHONE HYDROCHLORIDE 1 MG: 1 INJECTION, SOLUTION INTRAMUSCULAR; INTRAVENOUS; SUBCUTANEOUS at 15:28

## 2017-06-25 RX ADMIN — ONDANSETRON 4 MG: 2 SOLUTION INTRAMUSCULAR; INTRAVENOUS at 11:55

## 2017-06-25 RX ADMIN — METOCLOPRAMIDE 10 MG: 5 INJECTION, SOLUTION INTRAMUSCULAR; INTRAVENOUS at 11:57

## 2017-06-25 RX ADMIN — SODIUM CHLORIDE 1000 ML: 9 INJECTION, SOLUTION INTRAVENOUS at 11:52

## 2017-06-25 ASSESSMENT — ENCOUNTER SYMPTOMS
EYE DISCHARGE: 1
HEADACHES: 1

## 2017-06-25 NOTE — ED PROVIDER NOTES
"  History     Chief Complaint:  Headache    HPI   Syd Renee is a 31 year old male who presents with a headache. The patient has a history of cluster headaches, and has been seen twice this week with severe headaches. He came here yesterday and was given IV medications, with improvement of his symptoms. Last night before bed, however, his headache returned. It began with mild pain on his right forehead and right shoulder, and gradually worsened. He now reports stabbing pains on the right side of his head. He has had some tearing from his right eye. He took tylenol this morning with no relief.     Allergies:  The patient has no known drug allergies.    Medications:    Deltasone  Norco  Ceftin  Ibuprofen  Phenergan     Past Medical History:    Cluster headaches    Past Surgical History:    History reviewed.  No significant past surgical history.    Family History:    History reviewed.  No significant family history.    Social History:  Relationship status:   Tobacco use: Negative  Alcohol use: Positive  The patient presents with his wife.     Review of Systems   Eyes: Positive for discharge.   Neurological: Positive for headaches.   All other systems reviewed and are negative.      Physical Exam   First Vitals:  BP: 149/82  Heart Rate: 94  Temp: 98.4  F (36.9  C)  Resp: 16  Height: 177.8 cm (5' 10\")  Weight: 81.6 kg (180 lb)  SpO2: 99 %    Physical Exam  General: patient is alert and uncomfortable appearing.  HEENT: Head atraumatic    Eyes: pupils equal and reactive. Conjunctiva clear   Nares: patent   Oropharynx: no lesions, uvula midline, no palatal draping, normal voice, no trismus  Neck: Supple without lymphadenopathy, no meningismus  Chest: Heart regular rate and rhythm.   Lungs: Equal clear to auscultation with no wheeze or rales  Abdomen: Soft, non tender, nondistended, normal bowel sounds  Back: No costovertebral angle tenderness, no midline C, T or L spine tenderness  Neuro: Grossly nonfocal, normal " speech, strength equal bilaterally, CN 2-12 intact, normal finger to nose, normal gait.  Extremities: No deformities, equal radial and DP pulses. No clubbing, cyanosis.  No edema  Skin: Warm and dry with no rash.       Emergency Department Course     Imaging:  Radiographic findings were communicated with the patient who voiced understanding of the findings.    Head CT, without contrast, per radiology:   1. No acute intracranial abnormality.   2. Possible tiny 2 mm calcified meningioma along the convexity of the right parietal region.    Laboratory:  CBC: WBC 14.8 (H), HGB 17.4,  (H)  BMP: Glucose 115 (H), o/w WNL (Creatinine 0.84)  1152: INR: 1.02  CRP inflammation: <2.9  SED Rate: 2  Glucose CSF: 78 (H)  Protein CSF: 23  Gram stain: no organisms seen  CSF culture aerobic bacterial: pending  Lyme IgC and IgM CSF immunoblot: pending  HSV types 1 and 2: pending  Cell count with differential CSF: WBC CSF 0, RBC CSF 0, Colorless, CSF clear    Procedures:     Lumbar Puncture         INDICATION: Headache      CONSENT:  Risks (including but not limited to; infection, bleeding, spinal headache with possibility of spinal patch and temporary or permanent neurologic injury), benefits and alternatives were discussed with patient and consent for procedure was obtained.    TIMEOUT:  Universal protocol was followed. TIME OUT conducted just prior to starting procedure confirmed patient identity, site/side, procedure, patient position, and availability of correct equipment and implants? Yes      MEDICATIONS:  Lidocaine: Local infiltration    PROCEDURAL NOTE:  Patient was placed in a sitting, supported by bedside stand position.  The low back was prepped with Betadine.  The patient was medicated as above.  A spinal needle was used to gain access to the subarachnoid space with stylet in place  The fluid was clear.  Stylet was replaced and needle withdrawn.    PATIENT STATUS:  Patient tolerated the procedure well.  There were no  complications.      Interventions:  1152: Normal Saline, 1000 mL, IV  1155: Zofran, 4 mg, IV injection  1157: Reglan, 10 mg, IV injection  1159: Benadryl, 25 mg, IV injection  1403: Haldol, 2 mg, IV    Medications   0.9% sodium chloride BOLUS (0 mLs Intravenous Stopped 6/25/17 1252)   metoclopramide (REGLAN) injection 10 mg (10 mg Intravenous Given 6/25/17 1157)   diphenhydrAMINE (BENADRYL) injection 25 mg (25 mg Intravenous Given 6/25/17 1159)   lidocaine (XYLOCAINE) 4 % solution 10 mL (10 mLs Topical Given 6/25/17 1400)   HYDROmorphone (DILAUDID) injection 1 mg (1 mg Intravenous Given 6/25/17 1528)   haloperidol lactate (HALDOL) injection 2 mg (2 mg Intravenous Given 6/25/17 1403)   valproate (DEPACON) 500 mg in NaCl 0.9 % 100 mL intermittent infusion (500 mg Intravenous Given 6/25/17 1424)         Emergency Department Course:  Nursing notes and vitals reviewed.  I performed an exam of the patient as documented above.  The above workup was undertaken.  1212: I rechecked the patient and discussed results.  1308: I rechecked the patient.  1324: I performed the lumbar puncture documented above.  1356: I rechecked the patient.  1500: I rechecked the patient, reviewed results with patient and his wife, symptomatic treatment and follow up instructions    Findings and plan explained to the Patient. Patient discharged home, status improved, with instructions regarding supportive care, medications, and reasons to return as well as the importance of close follow-up was reviewed.      Impression & Plan      Medical Decision Making:  Syd Renee is a 31 year old male presents with his third visit for headache in the past week. Each time he has come he has felt improved, with the medications given in the ER, but then has recurrence of headache, mostly at nighttime. He thinks it is identical to his previous cluster headaches. This time it started at 0200, started mildly and increased from there. He had some tearing of right  eye associated with it. Given it is his third visit and he hasn't had recent imaging, he underwent head CT scan, which shows no acute intracranial abnormality. I elected to LP him to ensure there was no other acute process. SED rate and CRP are negative. Electrolytes are within acceptable limits. WBC count mildly elevated at 14.8, but he did get decadron yesterday, and is on prednisone to help peggy his headaches, which has worked in the past, though doesn't seem to be working this time. He has not taken any tylenol. We attempted intranasal lidocaine here in the ED, as well as high flow O2, without any change in headache pattern. He got Reglan and benadryl today, without change. Haldol made no difference in patient headache.  Patient will receive a dose of Depacon and then discharge with Norco for pain not controlled by tylenol or motrin.  Considered central venous thrombosis, vertebral artery dissection but no history to support these diagnoses.  Normal neurologic exam, history of cluster headache in the past and seeing neurology tomorrow.  If continues MRI/MRA/MRV could be considered. . Patient has follow up with Dr. Supriya Kwok. I gave a small prescription of Norco to help with headache at home, before following up with Dr. Kwok for further treatment recommendations from her. Patient is agreeable with management plan, and all questions and concerns were addressed.     Diagnosis:    ICD-10-CM   1. Intractable episodic cluster headache G44.011     Disposition:  Discharge to home with neurology follow up.     Discharge Medications:  New Prescriptions    No medications on file     Clark HERNADEZ am serving as a scribe on 6/25/2017 at 11:27 AM to personally document services performed by Winter Richmond MD, based on my observations and the provider's statements to me.     EMERGENCY DEPARTMENT       Winter Richmond MD  06/26/17 0601

## 2017-06-25 NOTE — ED AVS SNAPSHOT
Emergency Department    [address] [facility] [address]    Phone:  [phone]    Fax:  [phone]                                       Syd Renee   MRN: [id number]    Department:   Emergency Department   Date of Visit:  6/25/2017           Patient Information     Date Of Birth          1985        Your diagnoses for this visit were:     Intractable episodic cluster headache        You were seen by Winter Richmond MD.      Follow-up Information     Follow up with Kaur Grewal MD.    Specialty:  Neurology    Why:  your scheduled appointment tomorrow    Contact information:    [facility]  [address]  [facility] [address]  [phone]          Discharge Instructions         Cluster Headache  A cluster headache is different from a migraine or a tension headache. A cluster headache starts suddenly, without any warning. The pain can become severe within minutes. The headache is often brief. It can last only 15 minutes. But it can also continue for several hours.  The pain is around a single eye. You may have tears coming from that eye. That eyelid may become droopy. The eye may be red and swollen. You may also have a stuffy or runny nose on the affected side.  You may also have several headaches in one 24-hour period. These may return at the same time of day during the cluster period. A cluster headache ends as suddenly as it began. It often leaves you feeling exhausted for some time afterward.  Cluster headaches often occur at night and during certain times of the year that are unique to you. A cluster period ranges from a few weeks up to a few months. Then, the headaches may not come back for months or years.  Possible triggers include alcohol and smoking. Even a single alcoholic drink can trigger a headache during the cluster period. Another potential trigger is interrupted sleep patterns. Medicines like nitroglycerin can also cause a cluster  headache.  Treatment for cluster headaches varies. Headaches that last only 15 minutes aren t helped by over-the-counter medicines. That s because these medicines take 20 to 30 minutes to start working. Prescription medicines given by injection or as a nasal spray can stop a headache. This is called abortive treatment.  Preventive medicines include steroids and anti-seizure medicines. These can help cut the number of headaches you have during a cluster period. High dose oxygen therapy or a nerve stimulator may shorten each attack and make it less severe. If you have cluster headaches often or have severe symptoms, your healthcare provider may talk with you about surgery. Surgery may be able to stop the nerve that s sending the pain signals. These types of treatments are usually given by a specialist (neurologist or neurosurgeon).  Home care  Follow these tips when caring for yourself at home:    Don t drive yourself home if you were given pain medicine for your headache. Instead, have someone else drive you home. Try to sleep when you get home. You should feel much better when you wake up.    If your healthcare provider gave you preventive medicines, take them as directed. If abortive medicines were prescribed, carry these with you to take at first sign of the headache.    Stick to a regular sleep schedule. Avoid afternoon naps during a cluster period. If you have sleep apnea, talk with your provider about getting treatment for this condition. Sleep apnea greatly interferes with sleep patterns.    Stay away from gas fumes and oil-based paint fumes.    Avoid drinking alcohol and smoking during a cluster period.    Be cautious when traveling to high altitudes. Higher altitudes have less oxygen. This may trigger a headache.    Use sunglasses to avoid glare and bright lights.    Keep a headache journal. Record the date and time of each headache. Describe the quality of the pain. Note how severe it is, where it is, and  how long it lasts. Write down your response to any medicines to control the pain. Note possible triggers: Was it something you ate? Something you did just before the attack? Share this information with your provider to help him or her figure out the best treatment plan for you.    Talk with a counselor or therapist, or join a support group. This may help you cope with the effects of cluster headache on your life.  Follow-up care  Follow up with your healthcare provider, or as advised If you had a CT scan or an MRI, a specialist will review it. You will be told of any new findings that may affect your care.  When to seek medical advice  Call your healthcare provider right away if any of these occur:    Sudden, severe headache, worse than any you have had before    Headache with a fainting spell    Unexplained fever greater than 100.0  F (37.8  C), or as advised    Stiff neck or rash    Weakness in an arm or leg, or on one side of your face    Difficulty speaking    Headaches brought on by physical activity    Changes in your vision    You need to use more pain medicine than normal  Date Last Reviewed: 8/1/2016 2000-2017 [employer]. [address] [place] [address]. All rights reserved. This information is not intended as a substitute for professional medical care. Always follow your healthcare professional's instructions.          Self-Care for Headaches  Most headaches aren't serious and can be relieved with self-care. But some headaches may be a sign of another health problem like eye trouble or high blood pressure. To find the best treatment, learn what kind of headaches you get. For tension headaches, self-care will usually help. To treat migraines, ask your healthcare provider for advice. It is also possible to get both tension and migraine headaches. Self-care involves relieving the pain and avoiding headache  triggers  if you can.    Ways to reduce pain and tension  Try these  "steps:    Apply a cold compress or ice pack to the pain site.    Drink fluids. If nausea makes it hard to drink, try sucking on ice.    Rest. Protect yourself from bright light and loud noises.    Calm your emotions by imagining a peaceful scene.    Massage tight neck, shoulder, and head muscles.    To relax muscles, soak in a hot bath or use a hot shower.  Use medicines  Aspirin or aspirin substitutes, such as ibuprofen and acetaminophen, can relieve headache. Remember: Never give aspirin to anyone 18 years old or younger because of the risk of developing Reye syndrome. Use pain medicines only when necessary.  Track your headaches  Keeping a headache diary can help you and your healthcare provider identify what's causing your headaches:    Note when each headache happens.    Identify your activities and the foods you've eaten 6 to 8 hours before the headache began.    Look for any trends or \"triggers.\"  Signs of tension headache  Any of the following can be signs:    Dull pain or feeling of pressure in a tight band around your head    Pain in your neck or shoulders    Headache without a definite beginning or end    Headache after an activity such as driving or working on a computer  Signs of migraine  Any of the following can be signs:    Throbbing pain on one or both sides of your head    Nausea or vomiting    Extreme sensitivity to light, sound, and smells    Bright spots, flashes, or other visual changes    Pain or nausea so severe that you can't continue your daily activities  Call your healthcare provider   If you have any of the following symptoms, contact your healthcare provider:    A headache that lingers after a recent injury or bump to the head.    A fever with a stiff neck or pain when you bend your head toward your chest.    A headache along with slurred speech, changes in your vision, or numbness or weakness in your arms or legs.    A headache for longer than 3 days.    Frequent headaches, especially " "in the morning.    Headaches with seizures     Seek immediate medical attention if you have a headache that you would call \"the worst headache you have ever had.\"   Date Last Reviewed: 10/4/2015    2090-8745 [employer]. [address] [place] [address], [place] [address]. All rights reserved. This information is not intended as a substitute for professional medical care. Always follow your healthcare professional's instructions.           * HEADACHE [unspecified]    The cause of your headache today is not clear, but it does not appear to be the sign of any serious illness.  Under stress, some people tense the muscles of their shoulder, neck and scalp without knowing it. If this condition lasts long enough, a TENSION HEADACHE can occur.  A MIGRAINE HEADACHE is caused by changes in blood flow to the brain. It can be mild or severe. A migraine attack may be triggered by emotional stress, hormone changes during the menstrual cycle, oral contraceptives, alcohol use, certain foods containing tyramine, eye strain, weather changes, missing meals, lack of sleep or oversleeping.  Other causes of headache include a viral illness, sinus, ear or throat infection, dental pain and TMJ (jaw joint) pain.  HOME CARE:    If you were given pain medicine for this headache, do not drive yourself home. Arrange for a ride, instead. When you get home, try to sleep. You should feel much better when you wake up.    If you are having nausea or vomiting, follow a light diet until your headache is relieved.    If you have a migraine type headache, use sunglasses when in the daylight or around bright indoor lighting until symptoms improve. Bright glaring light can worsen this kind of headache.  FOLLOW UP with your doctor if the headache is not better within the next 24 hours. If you have frequent headaches you should discuss a treatment plan with your primary care doctor. By being aware of the earliest signs of headache, and starting " treatment right away, you may be able to stop the pain yourself.  GET PROMPT MEDICAL ATTENTION if any of the following occur:    Worsening of your head pain or no improvement within 24 hours    Repeated vomiting (unable to keep liquids down)    Fever over 101 F (38.3 C)    Stiff neck    Extreme drowsiness, confusion or fainting    Weakness of an arm or leg or one side of the face    Difficulty with speech or vision    7964-4582 [employer] [facility], [address] [place] [address], [place] [address]. All rights reserved. This information is not intended as a substitute for professional medical care. Always follow your healthcare professional's instructions.      24 Hour Appointment Hotline       To make an appointment at any [facility], call [phone] ([phone]). If you don't have a family doctor or clinic, we will help you find one. [facility] clinics are conveniently located to serve the needs of you and your family.             Review of your medicines      CONTINUE these medicines which may have CHANGED, or have new prescriptions. If we are uncertain of the size of tablets/capsules you have at home, strength may be listed as something that might have changed.        Dose / Directions Last dose taken    * HYDROcodone-acetaminophen 5-325 MG per tablet   Commonly known as:  NORCO   Dose:  1-2 tablet   What changed:  Another medication with the same name was added. Make sure you understand how and when to take each.   Quantity:  15 tablet        Take 1-2 tablets by mouth every 4 hours as needed for pain   Refills:  0        * HYDROcodone-acetaminophen 5-325 MG per tablet   Commonly known as:  NORCO   Dose:  1-2 tablet   What changed:  You were already taking a medication with the same name, and this prescription was added. Make sure you understand how and when to take each.   Quantity:  15 tablet        Take 1-2 tablets by mouth every 4 hours as needed for moderate to severe pain   Refills:  0        * Notice:  This  list has 2 medication(s) that are the same as other medications prescribed for you. Read the directions carefully, and ask your doctor or other care provider to review them with you.      Our records show that you are taking the medicines listed below. If these are incorrect, please call your family doctor or clinic.        Dose / Directions Last dose taken    cefuroxime 250 MG/5ML suspension   Commonly known as:  CEFTIN   Dose:  500 mg        Take 500 mg by mouth 2 times daily   Refills:  0        IBUPROFEN PO        Refills:  0        predniSONE 20 MG tablet   Commonly known as:  DELTASONE   Quantity:  10 tablet        Take two tablets (= 40mg) each day for 5 (five) days   Refills:  0        promethazine 25 MG tablet   Commonly known as:  PHENERGAN   Dose:  12.5 mg   Quantity:  10 tablet        Take 0.5 tablets (12.5 mg) by mouth every 6 hours as needed for nausea or other (headache)   Refills:  1                Prescriptions were sent or printed at these locations (1 Prescription)                   Other Prescriptions                Printed at Department/Unit printer (1 of 1)         HYDROcodone-acetaminophen (NORCO) 5-325 MG per tablet                Procedures and tests performed during your visit     Basic metabolic panel    CBC with platelets differential    CRP inflammation    CSF Culture Aerobic Bacterial    CT Head w/o Contrast    Cell count with differential CSF: Tube 4    Erythrocyte sedimentation rate auto    Glucose CSF: Tube 2    Gram stain    HSV Types 1 and 2 Qualitative PCR CSF: Tube 3    INR    Lyme IgG and IgM CSF Immunoblot: Tube 3    Protein total CSF: Tube 2      Orders Needing Specimen Collection     None      Pending Results     Date and Time Order Name Status Description    6/25/2017 1320 HSV Types 1 and 2 Qualitative PCR CSF: Tube 3 In process     6/25/2017 1320 Lyme IgG and IgM CSF Immunoblot: Tube 3 In process     6/25/2017 1320 CSF Culture Aerobic Bacterial In process     6/25/2017 1320  Gram stain Preliminary             Pending Culture Results     Date and Time Order Name Status Description    6/25/2017 1320 HSV Types 1 and 2 Qualitative PCR CSF: Tube 3 In process     6/25/2017 1320 Lyme IgG and IgM CSF Immunoblot: Tube 3 In process     6/25/2017 1320 CSF Culture Aerobic Bacterial In process     6/25/2017 1320 Gram stain Preliminary             Pending Results Instructions     If you had any lab results that were not finalized at the time of your Discharge, you can call the ED Lab Result RN at [phone]. You will be contacted by this team for any positive Lab results or changes in treatment. The nurses are available 7 days a week from 10A to 6:30P.  You can leave a message 24 hours per day and they will return your call.        Test Results From Your Hospital Stay        6/25/2017 12:23 PM      Component Results     Component Value Ref Range & Units Status    Sed Rate 2 0 - 15 mm/h Final         6/25/2017 12:22 PM      Component Results     Component Value Ref Range & Units Status    CRP Inflammation <2.9 0.0 - 8.0 mg/L Final         6/25/2017 12:57 PM      Narrative     CT HEAD WITHOUT CONTRAST 6/25/2017 12:31 PM     HISTORY: Headache.      COMPARISON: MRI brain 2/22/2015.    TECHNIQUE: Noncontrast axial images are obtained from the skull base  to the vertex. Radiation dose for this scan was reduced using  automated exposure control, adjustment of the mA and/or kV according  to patient size, or iterative reconstruction technique.    FINDINGS: The imaged portions of the paranasal sinuses are clear. No  fracture is evident. The ventricles are normal in size, shape and  position. There is no acute intracranial hemorrhage, mass effect or  stroke. Small calcified lesion is noted in the right parietal region  on series 2, image 22 measuring 2 mm in diameter. This could reflect a  tiny calcified meningioma. No surrounding mass effect.        Impression     IMPRESSION:  1. No acute intracranial  abnormality.   2. Possible tiny 2 mm calcified meningioma along the convexity of the  right parietal region.    SARA BHANDARI MD         6/25/2017 12:22 PM      Component Results     Component Value Ref Range & Units Status    Sodium 136 133 - 144 mmol/L Final    Potassium 4.0 3.4 - 5.3 mmol/L Final    Chloride 103 94 - 109 mmol/L Final    Carbon Dioxide 25 20 - 32 mmol/L Final    Anion Gap 8 3 - 14 mmol/L Final    Glucose 115 (H) 70 - 99 mg/dL Final    Urea Nitrogen 8 7 - 30 mg/dL Final    Creatinine 0.84 0.66 - 1.25 mg/dL Final    GFR Estimate >90  Non  GFR Calc   >60 mL/min/1.7m2 Final    GFR Estimate If Black >90   GFR Calc   >60 mL/min/1.7m2 Final    Calcium 9.3 8.5 - 10.1 mg/dL Final         6/25/2017 12:02 PM      Component Results     Component Value Ref Range & Units Status    WBC 14.8 (H) 4.0 - 11.0 10e9/L Final    RBC Count 5.96 (H) 4.4 - 5.9 10e12/L Final    Hemoglobin 17.4 13.3 - 17.7 g/dL Final    Hematocrit 48.2 40.0 - 53.0 % Final    MCV 81 78 - 100 fl Final    MCH 29.2 26.5 - 33.0 pg Final    MCHC 36.1 31.5 - 36.5 g/dL Final    RDW 13.3 10.0 - 15.0 % Final    Platelet Count 465 (H) 150 - 450 10e9/L Final    Diff Method Automated Method  Final    % Neutrophils 90.0 % Final    % Lymphocytes 5.4 % Final    % Monocytes 4.0 % Final    % Eosinophils 0.0 % Final    % Basophils 0.1 % Final    % Immature Granulocytes 0.5 % Final    Nucleated RBCs 0 0 /100 Final    Absolute Neutrophil 13.3 (H) 1.6 - 8.3 10e9/L Final    Absolute Lymphocytes 0.8 0.8 - 5.3 10e9/L Final    Absolute Monocytes 0.6 0.0 - 1.3 10e9/L Final    Absolute Eosinophils 0.0 0.0 - 0.7 10e9/L Final    Absolute Basophils 0.0 0.0 - 0.2 10e9/L Final    Abs Immature Granulocytes 0.1 0 - 0.4 10e9/L Final    Absolute Nucleated RBC 0.0  Final         6/25/2017 12:19 PM      Component Results     Component Value Ref Range & Units Status    INR 1.02 0.86 - 1.14 Final         6/25/2017  2:11 PM      Component Results      Component Value Ref Range & Units Status    Glucose CSF 78 (H) 40 - 70 mg/dL Final    CSF glucose concentrations are about 60 percent of normal plasma glucose.         6/25/2017  2:11 PM      Component Results     Component Value Ref Range & Units Status    Protein Total CSF 23 15 - 60 mg/dL Final         6/25/2017  2:44 PM      Component Results     Component    Specimen Description    Cerebrospinal fluid    Gram Stain    No organisms seen  No PMNs seen  Gram stain result is preliminary and awaits review of Microbiology Staff.      Micro Report Status    Pending         6/25/2017  1:48 PM         6/25/2017  2:14 PM      Component Results     Component Value Ref Range & Units Status    WBC CSF 0 0 - 5 /uL Final    RBC CSF 0 0 - 2 /uL Final    Tube Number 4 # Final    Color CSF Colorless CLRL Final    Appearance CSF Clear CLER Final         6/25/2017  1:47 PM         6/25/2017  1:47 PM                Clinical Quality Measure: Blood Pressure Screening     Your blood pressure was checked while you were in the emergency department today. The last reading we obtained was  BP: 136/75 . Please read the guidelines below about what these numbers mean and what you should do about them.  If your systolic blood pressure (the top number) is less than 120 and your diastolic blood pressure (the bottom number) is less than 80, then your blood pressure is normal. There is nothing more that you need to do about it.  If your systolic blood pressure (the top number) is 120-139 or your diastolic blood pressure (the bottom number) is 80-89, your blood pressure may be higher than it should be. You should have your blood pressure rechecked within a year by a primary care provider.  If your systolic blood pressure (the top number) is 140 or greater or your diastolic blood pressure (the bottom number) is 90 or greater, you may have high blood pressure. High blood pressure is treatable, but if left untreated over time it can put you at risk  "for heart attack, stroke, or kidney failure. You should have your blood pressure rechecked by a primary care provider within the next 4 weeks.  If your provider in the emergency department today gave you specific instructions to follow-up with your doctor or provider even sooner than that, you should follow that instruction and not wait for up to 4 weeks for your follow-up visit.        Thank you for choosing [facility]       Thank you for choosing [facility] for your care. Our goal is always to provide you with excellent care. Hearing back from our patients is one way we can continue to improve our services. Please take a few minutes to complete the written survey that you may receive in the mail after you visit with us. Thank you!        [employer] Information     [employer] lets you send messages to your doctor, view your test results, renew your prescriptions, schedule appointments and more. To sign up, go to [email or web address] . Click on \"Log in\" on the left side of the screen, which will take you to the Welcome page. Then click on \"Sign up Now\" on the right side of the page.     You will be asked to enter the access code listed below, as well as some personal information. Please follow the directions to create your username and password.     Your access code is: [email or web address]  Expires: 2017  4:15 PM     Your access code will  in 90 days. If you need help or a new code, please call your [facility] clinic or [phone].        Care EveryWhere ID     This is your Care EveryWhere ID. This could be used by other organizations to access your [facility] medical records  [id number]        Equal Access to Services     JOON WHITLOCK : Hadii agusto Weaver, waaxda luqadaha, qaybta kaalgaurav lloyd . So [facility] [phone].    ATENCIÓN: Si habla español, tiene a parr disposición servicios gratuitos de asistencia lingüística. Llame al [phone].    We comply with " applicable federal civil rights laws and [place] laws. We do not discriminate on the basis of race, color, national origin, age, disability sex, sexual orientation or gender identity.            After Visit Summary       This is your record. Keep this with you and show to your community pharmacist(s) and doctor(s) at your next visit.

## 2017-06-25 NOTE — ED AVS SNAPSHOT
Emergency Department    [address] [facility] [address]    Phone:  [phone]    Fax:  [phone]                                       Syd Renee   MRN: [id number]    Department:   Emergency Department   Date of Visit:  6/25/2017           After Visit Summary Signature Page     I have received my discharge instructions, and my questions have been answered. I have discussed any challenges I see with this plan with the nurse or doctor.    ..........................................................................................................................................  Patient/Patient Representative Signature      ..........................................................................................................................................  Patient Representative Print Name and Relationship to Patient    ..................................................               ................................................  Date                                            Time    ..........................................................................................................................................  Reviewed by Signature/Title    ...................................................              ..............................................  Date                                                            Time

## 2017-06-25 NOTE — DISCHARGE INSTRUCTIONS
Cluster Headache  A cluster headache is different from a migraine or a tension headache. A cluster headache starts suddenly, without any warning. The pain can become severe within minutes. The headache is often brief. It can last only 15 minutes. But it can also continue for several hours.  The pain is around a single eye. You may have tears coming from that eye. That eyelid may become droopy. The eye may be red and swollen. You may also have a stuffy or runny nose on the affected side.  You may also have several headaches in one 24-hour period. These may return at the same time of day during the cluster period. A cluster headache ends as suddenly as it began. It often leaves you feeling exhausted for some time afterward.  Cluster headaches often occur at night and during certain times of the year that are unique to you. A cluster period ranges from a few weeks up to a few months. Then, the headaches may not come back for months or years.  Possible triggers include alcohol and smoking. Even a single alcoholic drink can trigger a headache during the cluster period. Another potential trigger is interrupted sleep patterns. Medicines like nitroglycerin can also cause a cluster headache.  Treatment for cluster headaches varies. Headaches that last only 15 minutes aren t helped by over-the-counter medicines. That s because these medicines take 20 to 30 minutes to start working. Prescription medicines given by injection or as a nasal spray can stop a headache. This is called abortive treatment.  Preventive medicines include steroids and anti-seizure medicines. These can help cut the number of headaches you have during a cluster period. High dose oxygen therapy or a nerve stimulator may shorten each attack and make it less severe. If you have cluster headaches often or have severe symptoms, your healthcare provider may talk with you about surgery. Surgery may be able to stop the nerve that s sending the pain  signals. These types of treatments are usually given by a specialist (neurologist or neurosurgeon).  Home care  Follow these tips when caring for yourself at home:    Don t drive yourself home if you were given pain medicine for your headache. Instead, have someone else drive you home. Try to sleep when you get home. You should feel much better when you wake up.    If your healthcare provider gave you preventive medicines, take them as directed. If abortive medicines were prescribed, carry these with you to take at first sign of the headache.    Stick to a regular sleep schedule. Avoid afternoon naps during a cluster period. If you have sleep apnea, talk with your provider about getting treatment for this condition. Sleep apnea greatly interferes with sleep patterns.    Stay away from gas fumes and oil-based paint fumes.    Avoid drinking alcohol and smoking during a cluster period.    Be cautious when traveling to high altitudes. Higher altitudes have less oxygen. This may trigger a headache.    Use sunglasses to avoid glare and bright lights.    Keep a headache journal. Record the date and time of each headache. Describe the quality of the pain. Note how severe it is, where it is, and how long it lasts. Write down your response to any medicines to control the pain. Note possible triggers: Was it something you ate? Something you did just before the attack? Share this information with your provider to help him or her figure out the best treatment plan for you.    Talk with a counselor or therapist, or join a support group. This may help you cope with the effects of cluster headache on your life.  Follow-up care  Follow up with your healthcare provider, or as advised If you had a CT scan or an MRI, a specialist will review it. You will be told of any new findings that may affect your care.  When to seek medical advice  Call your healthcare provider right away if any of these occur:    Sudden, severe headache, worse  than any you have had before    Headache with a fainting spell    Unexplained fever greater than 100.0  F (37.8  C), or as advised    Stiff neck or rash    Weakness in an arm or leg, or on one side of your face    Difficulty speaking    Headaches brought on by physical activity    Changes in your vision    You need to use more pain medicine than normal  Date Last Reviewed: 8/1/2016 2000-2017 The "[employer]". [address] [place] [address], [place] [address]. All rights reserved. This information is not intended as a substitute for professional medical care. Always follow your healthcare professional's instructions.          Self-Care for Headaches  Most headaches aren't serious and can be relieved with self-care. But some headaches may be a sign of another health problem like eye trouble or high blood pressure. To find the best treatment, learn what kind of headaches you get. For tension headaches, self-care will usually help. To treat migraines, ask your healthcare provider for advice. It is also possible to get both tension and migraine headaches. Self-care involves relieving the pain and avoiding headache  triggers  if you can.    Ways to reduce pain and tension  Try these steps:    Apply a cold compress or ice pack to the pain site.    Drink fluids. If nausea makes it hard to drink, try sucking on ice.    Rest. Protect yourself from bright light and loud noises.    Calm your emotions by imagining a peaceful scene.    Massage tight neck, shoulder, and head muscles.    To relax muscles, soak in a hot bath or use a hot shower.  Use medicines  Aspirin or aspirin substitutes, such as ibuprofen and acetaminophen, can relieve headache. Remember: Never give aspirin to anyone 18 years old or younger because of the risk of developing Reye syndrome. Use pain medicines only when necessary.  Track your headaches  Keeping a headache diary can help you and your healthcare provider identify what's causing your  "headaches:    Note when each headache happens.    Identify your activities and the foods you've eaten 6 to 8 hours before the headache began.    Look for any trends or \"triggers.\"  Signs of tension headache  Any of the following can be signs:    Dull pain or feeling of pressure in a tight band around your head    Pain in your neck or shoulders    Headache without a definite beginning or end    Headache after an activity such as driving or working on a computer  Signs of migraine  Any of the following can be signs:    Throbbing pain on one or both sides of your head    Nausea or vomiting    Extreme sensitivity to light, sound, and smells    Bright spots, flashes, or other visual changes    Pain or nausea so severe that you can't continue your daily activities  Call your healthcare provider   If you have any of the following symptoms, contact your healthcare provider:    A headache that lingers after a recent injury or bump to the head.    A fever with a stiff neck or pain when you bend your head toward your chest.    A headache along with slurred speech, changes in your vision, or numbness or weakness in your arms or legs.    A headache for longer than 3 days.    Frequent headaches, especially in the morning.    Headaches with seizures     Seek immediate medical attention if you have a headache that you would call \"the worst headache you have ever had.\"   Date Last Reviewed: 10/4/2015    8219-1942 [employer]. [address] [place] [address], [place] [address]. All rights reserved. This information is not intended as a substitute for professional medical care. Always follow your healthcare professional's instructions.           * HEADACHE [unspecified]    The cause of your headache today is not clear, but it does not appear to be the sign of any serious illness.  Under stress, some people tense the muscles of their shoulder, neck and scalp without knowing it. If this condition lasts long enough, a TENSION " HEADACHE can occur.  A MIGRAINE HEADACHE is caused by changes in blood flow to the brain. It can be mild or severe. A migraine attack may be triggered by emotional stress, hormone changes during the menstrual cycle, oral contraceptives, alcohol use, certain foods containing tyramine, eye strain, weather changes, missing meals, lack of sleep or oversleeping.  Other causes of headache include a viral illness, sinus, ear or throat infection, dental pain and TMJ (jaw joint) pain.  HOME CARE:    If you were given pain medicine for this headache, do not drive yourself home. Arrange for a ride, instead. When you get home, try to sleep. You should feel much better when you wake up.    If you are having nausea or vomiting, follow a light diet until your headache is relieved.    If you have a migraine type headache, use sunglasses when in the daylight or around bright indoor lighting until symptoms improve. Bright glaring light can worsen this kind of headache.  FOLLOW UP with your doctor if the headache is not better within the next 24 hours. If you have frequent headaches you should discuss a treatment plan with your primary care doctor. By being aware of the earliest signs of headache, and starting treatment right away, you may be able to stop the pain yourself.  GET PROMPT MEDICAL ATTENTION if any of the following occur:    Worsening of your head pain or no improvement within 24 hours    Repeated vomiting (unable to keep liquids down)    Fever over 101 F (38.3 C)    Stiff neck    Extreme drowsiness, confusion or fainting    Weakness of an arm or leg or one side of the face    Difficulty with speech or vision    3343-4180 [facility], [address] [place] [address], [place] [address]. All rights reserved. This information is not intended as a substitute for professional medical care. Always follow your healthcare professional's instructions.

## 2017-06-27 LAB
B BURGDOR IGG CSF QL IB: NORMAL
B BURGDOR IGM CSF QL IB: NORMAL
HSV1 DNA CSF QL NAA+PROBE: NORMAL
HSV2 DNA CSF QL NAA+PROBE: NORMAL
MICROBIOLOGIST REVIEW: NORMAL

## 2017-06-30 LAB
BACTERIA SPEC CULT: NO GROWTH
MICRO REPORT STATUS: NORMAL
SPECIMEN SOURCE: NORMAL

## 2017-09-15 ENCOUNTER — PRE VISIT (OUTPATIENT)
Dept: NEUROLOGY | Facility: CLINIC | Age: 32
End: 2017-09-15

## 2017-09-15 NOTE — TELEPHONE ENCOUNTER
1.  Date/reason for appt: 10/12/17 cluster headaches     2.  Referring provider: LIGIA KOWALSKI     3.  Call to patient (Yes / No - short description): no, referral     4.  Previous care at / records requested from: [facility]    5.  Other: Records received from [facility].   Included  Office notes: 6/28/17, 3/2/15, 6/26/17    Images and ED notes are in Epic.

## 2017-10-03 ENCOUNTER — HOSPITAL ENCOUNTER (EMERGENCY)
Facility: CLINIC | Age: 32
Discharge: HOME | End: 2017-10-03
Attending: EMERGENCY MEDICINE | Admitting: EMERGENCY MEDICINE
Payer: COMMERCIAL

## 2017-10-03 VITALS
RESPIRATION RATE: 18 BRPM | HEIGHT: 72 IN | WEIGHT: 170 LBS | TEMPERATURE: 98.4 F | BODY MASS INDEX: 23.03 KG/M2 | OXYGEN SATURATION: 98 % | SYSTOLIC BLOOD PRESSURE: 129 MMHG | DIASTOLIC BLOOD PRESSURE: 73 MMHG

## 2017-10-03 DIAGNOSIS — R51.9 CHRONIC NONINTRACTABLE HEADACHE, UNSPECIFIED HEADACHE TYPE: ICD-10-CM

## 2017-10-03 DIAGNOSIS — G89.29 CHRONIC NONINTRACTABLE HEADACHE, UNSPECIFIED HEADACHE TYPE: ICD-10-CM

## 2017-10-03 PROCEDURE — 99282 EMERGENCY DEPT VISIT SF MDM: CPT

## 2017-10-03 ASSESSMENT — ENCOUNTER SYMPTOMS
SHORTNESS OF BREATH: 0
WEAKNESS: 0
DIZZINESS: 0
COUGH: 0
NUMBNESS: 0
HEADACHES: 1

## 2017-10-03 NOTE — ED AVS SNAPSHOT
Emergency Department    [address] [facility] [address]    Phone:  [phone]    Fax:  [phone]                                       Syd Renee   MRN: [id number]    Department:   Emergency Department   Date of Visit:  10/3/2017           After Visit Summary Signature Page     I have received my discharge instructions, and my questions have been answered. I have discussed any challenges I see with this plan with the nurse or doctor.    ..........................................................................................................................................  Patient/Patient Representative Signature      ..........................................................................................................................................  Patient Representative Print Name and Relationship to Patient    ..................................................               ................................................  Date                                            Time    ..........................................................................................................................................  Reviewed by Signature/Title    ...................................................              ..............................................  Date                                                            Time

## 2017-10-03 NOTE — ED AVS SNAPSHOT
Emergency Department    [address] [facility] [address]    Phone:  [phone]    Fax:  [phone]                                       Syd Renee   MRN: [id number]    Department:   Emergency Department   Date of Visit:  10/3/2017           Patient Information     Date Of Birth          1985        Your diagnoses for this visit were:     Chronic nonintractable headache, unspecified headache type        You were seen by Shae Celestin MD.      Follow-up Information     Call Kaur Grewal MD.    Specialty:  Neurology    Why:  As needed    Contact information:    [facility]  [address] [address] [phone]          Follow up with  Emergency Department.    Specialty:  EMERGENCY MEDICINE    Why:  As needed, If symptoms worsen    Contact information:    [address] [facility] [id number] [phone]        Discharge Instructions       Go to your scheduled Lumbar puncture appointment at [facility].  This is at 8:15am on Thursday (10/5).  Call your clinic with any concerns.    Future Appointments        Provider Department Dept Phone Center    10/5/2017 9:00 AM  Neuro Radiologist;  Imaging Nurse; [facility] XRAY ROOM 4 [facility] Radiology [phone] [facility]    10/12/2017 12:00 PM NAVEEN [facility] Neurology [phone] [facility]      24 Hour Appointment Hotline       To make an appointment at any [facility], call [phone] ([phone]). If you don't have a family doctor or clinic, we will help you find one. [place] clinics are conveniently located to serve the needs of you and your family.             Review of your medicines      Our records show that you are taking the medicines listed below. If these are incorrect, please call your family doctor or clinic.        Dose / Directions Last dose taken    ACETAZOLAMIDE PO        Refills:  0         cefuroxime 250 MG/5ML suspension   Commonly known as:  CEFTIN   Dose:  500 mg        Take 500 mg by mouth 2 times daily   Refills:  0        * HYDROcodone-acetaminophen 5-325 MG per tablet   Commonly known as:  NORCO   Dose:  1-2 tablet   Quantity:  15 tablet        Take 1-2 tablets by mouth every 4 hours as needed for pain   Refills:  0        * HYDROcodone-acetaminophen 5-325 MG per tablet   Commonly known as:  NORCO   Dose:  1-2 tablet   Quantity:  15 tablet        Take 1-2 tablets by mouth every 4 hours as needed for moderate to severe pain   Refills:  0        IBUPROFEN PO        Refills:  0        INDOMETHACIN PO        Refills:  0        predniSONE 20 MG tablet   Commonly known as:  DELTASONE   Quantity:  10 tablet        Take two tablets (= 40mg) each day for 5 (five) days   Refills:  0        promethazine 25 MG tablet   Commonly known as:  PHENERGAN   Dose:  12.5 mg   Quantity:  10 tablet        Take 0.5 tablets (12.5 mg) by mouth every 6 hours as needed for nausea or other (headache)   Refills:  1        VERAPAMIL HCL PO        Refills:  0        * Notice:  This list has 2 medication(s) that are the same as other medications prescribed for you. Read the directions carefully, and ask your doctor or other care provider to review them with you.            Orders Needing Specimen Collection     None      Pending Results     No orders found from 10/1/2017 to 10/4/2017.            Pending Culture Results     No orders found from 10/1/2017 to 10/4/2017.            Pending Results Instructions     If you had any lab results that were not finalized at the time of your Discharge, you can call the ED Lab Result RN at [phone]. You will be contacted by this team for any positive Lab results or changes in treatment. The nurses are available 7 days a week from 10A to 6:30P.  You can leave a message 24 hours per day and they will return your call.        Test Results From Your Hospital Stay               Clinical  Quality Measure: Blood Pressure Screening     Your blood pressure was checked while you were in the emergency department today. The last reading we obtained was  BP: 129/73 . Please read the guidelines below about what these numbers mean and what you should do about them.  If your systolic blood pressure (the top number) is less than 120 and your diastolic blood pressure (the bottom number) is less than 80, then your blood pressure is normal. There is nothing more that you need to do about it.  If your systolic blood pressure (the top number) is 120-139 or your diastolic blood pressure (the bottom number) is 80-89, your blood pressure may be higher than it should be. You should have your blood pressure rechecked within a year by a primary care provider.  If your systolic blood pressure (the top number) is 140 or greater or your diastolic blood pressure (the bottom number) is 90 or greater, you may have high blood pressure. High blood pressure is treatable, but if left untreated over time it can put you at risk for heart attack, stroke, or kidney failure. You should have your blood pressure rechecked by a primary care provider within the next 4 weeks.  If your provider in the emergency department today gave you specific instructions to follow-up with your doctor or provider even sooner than that, you should follow that instruction and not wait for up to 4 weeks for your follow-up visit.        Thank you for choosing [facility]       Thank you for choosing [facility] for your care. Our goal is always to provide you with excellent care. Hearing back from our patients is one way we can continue to improve our services. Please take a few minutes to complete the written survey that you may receive in the mail after you visit with us. Thank you!        [employer] Information     [email or web address] lets you send messages to your doctor, view your test results, renew your prescriptions, schedule appointments and more. To sign up, go to  [email or web address] . Click on \"Log in\" on the left side of the screen, which will take you to the Welcome page. Then click on \"Sign up Now\" on the right side of the page.     You will be asked to enter the access code listed below, as well as some personal information. Please follow the directions to create your username and password.     Your access code is: [email or web address]  Expires: 2017  6:30 AM     Your access code will  in 90 days. If you need help or a new code, please call your [place] [facility] clinic or [phone].        Care EveryWhere ID     This is your Care EveryWhere ID. This could be used by other organizations to access your [place] [facility] medical records  [id number]        Equal Access to Services     PATRICIA WHITLOCK : Yenny Weaver, akil rapp, emilio doshi, gaurav hurtado . So [facility] [phone].    ATENCIÓN: Si habla español, tiene a parr disposición servicios gratuitos de asistencia lingüística. Llame al [phone].    We comply with applicable federal civil rights laws and [place] laws. We do not discriminate on the basis of race, color, national origin, age, disability, sex, sexual orientation, or gender identity.            After Visit Summary       This is your record. Keep this with you and show to your community pharmacist(s) and doctor(s) at your next visit.                  "

## 2017-10-03 NOTE — ED PROVIDER NOTES
History     Chief Complaint:  Headache     HPI   Syd Renee is a 32 year old male with history of cluster headache who presents to the emergency department today for evaluation of headache. The patient reports having daily headaches for the past four months. He states this is similar to another episode of cluster headaches in 2015 which resolved with prednisone. He was seen by his neurologist for his current headaches and was prescribed indomethacin one month ago which moderately relieved his headaches for 24 hour periods. He states his headaches resolve when taking indomethacin and a 20-30 minute nap. He was evaluated by an ophthalmologist yesterday was told his optic nerve was inflamed, and recommended an MRI and lumbar puncture for further evaluation. The patient had an MRI this morning and was called afterwards and was reportedly told to present in the emergency department for a lumbar puncture. He reports his headaches are only on the right side of his face and head, starting in the back and radiating to the front. They are sharp and throbbing in nature. He also reports having an occipital nerve block and an IM sumatriptan prescription three months ago, however these did not resolve his symptoms. He denies any aura or vision changes with his headaches. He denies any lightheadedness, dizziness, weakness, nausea, vomiting, diarrhea, cough, chest pain, or shortness of breath.    Allergies:  No Known Drug Allergies      Medications:    INDOMETHACIN PO  VERAPAMIL HCL PO  ACETAZOLAMIDE PO  HYDROcodone-acetaminophen (NORCO) 5-325 MG per tablet  predniSONE (DELTASONE) 20 MG tablet  cefUROXime (CEFTIN) 250 MG/5ML suspension  promethazine (PHENERGAN) 25 MG tablet    Past Medical History:    Cluster headache     Past Surgical History:    History reviewed. No pertinent surgical history.    Family History:    History reviewed. No pertinent family history.      Social History:  The patient was accompanied to the ED by  his wife.  Smoking Status: Never smoker  Smokeless Tobacco: Never used   Alcohol Use: Yes    Marital Status:       Review of Systems   Eyes: Negative for visual disturbance.   Respiratory: Negative for cough and shortness of breath.    Cardiovascular: Negative for chest pain.   Neurological: Positive for headaches. Negative for dizziness, weakness and numbness.   All other systems reviewed and are negative.    Physical Exam     Patient Vitals for the past 24 hrs:   BP Temp Temp src Heart Rate Resp SpO2 Height Weight   10/03/17 1208 129/73 98.4  F (36.9  C) Oral 89 18 98 % 1.829 m (6') 77.1 kg (170 lb)      Physical Exam  General: Resting on the gurney, appears uncomfortable  Head:  The scalp, face, and head appear normal  Mouth/Throat: Mucus membranes are moist  CV:  Regular rate    Normal S1 and S2  No pathological murmur   Resp:  Breath sounds clear and equal bilaterally    Non-labored, no retractions or accessory muscle use    No coarseness    No wheezing   GI:  Abdomen is soft, no rigidity    No tenderness to palpation  MS:  Normal motor assessment of all extremities.    Good capillary refill noted.      Skin:  No rash or lesions noted.  Neuro:  Speech is normal and fluent. No apparent deficit.   Psych: Awake. Alert.  Normal affect.      Appropriate interactions.   Emergency Department Course     Emergency Department Course:  Nursing notes and vitals reviewed.  1339 I performed an exam of the patient as documented above.   1341 I spoke with Dr. Rainey of the Neurology service regarding patient's presentation, findings, and plan of care.   1348 I spoke with Dr. Rainey of the Neurology service regarding patient's presentation, findings, and plan of care. An outpatient lumbar puncture was scheduled for patient   I discussed the treatment plan with the patient. They expressed understanding of this plan and consented to discharge. They will be discharged home with instructions for care and follow up. In  addition, the patient will return to the emergency department if their symptoms worsen, if new symptoms arise or if there is any concern.  All questions were answered.   Impression & Plan      Medical Decision Making:  Syd Renee is a 32 year old male who presents for a lumbar puncture.  He reports being told that he needed to have one done at [facility] and has previously had an LP done in the ED so her presented here.  His neurology group was contacted and they report that he is supposed to have an LP but this was scheduled as an outpt procedure on Thursday.  He was provided this information and discharged to follow up as instructed.      Diagnosis:    ICD-10-CM    1. Chronic nonintractable headache, unspecified headache type R51        Disposition:  Discharged to home.     Woo Garcia  10/3/2017    EMERGENCY DEPARTMENT  Scribe Disclosure:  I, Woo Garcia, am serving as a scribe at 1:13 PM on 10/3/2017 to document services personally performed by Shae Celestin MD based on my observations and the provider's statements to me.       Shae Celestin MD  10/09/17 1144

## 2017-10-03 NOTE — DISCHARGE INSTRUCTIONS
Go to your scheduled Lumbar puncture appointment at [facility].  This is at 8:15am on Thursday (10/5).  Call your clinic with any concerns.

## 2017-10-05 ENCOUNTER — HOSPITAL ENCOUNTER (OUTPATIENT)
Facility: CLINIC | Age: 32
Discharge: HOME | End: 2017-10-05
Attending: RADIOLOGY | Admitting: RADIOLOGY
Payer: COMMERCIAL

## 2017-10-05 ENCOUNTER — HOSPITAL ENCOUNTER (OUTPATIENT)
Dept: GENERAL RADIOLOGY | Facility: CLINIC | Age: 32
End: 2017-10-05
Attending: PSYCHIATRY & NEUROLOGY | Admitting: RADIOLOGY
Payer: COMMERCIAL

## 2017-10-05 VITALS
TEMPERATURE: 96.9 F | OXYGEN SATURATION: 98 % | HEART RATE: 79 BPM | SYSTOLIC BLOOD PRESSURE: 123 MMHG | RESPIRATION RATE: 16 BRPM | DIASTOLIC BLOOD PRESSURE: 69 MMHG

## 2017-10-05 DIAGNOSIS — H47.10 OPTIC DISC EDEMA: ICD-10-CM

## 2017-10-05 LAB
APPEARANCE CSF: CLEAR
COLOR CSF: COLORLESS
GLUCOSE CSF-MCNC: 65 MG/DL (ref 40–70)
GRAM STN SPEC: NORMAL
PROT CSF-MCNC: 33 MG/DL (ref 15–60)
RBC # CSF MANUAL: 38 /UL (ref 0–2)
SPECIMEN SOURCE: NORMAL
TUBE # CSF: 3 #
WBC # CSF MANUAL: 2 /UL (ref 0–5)

## 2017-10-05 PROCEDURE — 89050 BODY FLUID CELL COUNT: CPT | Performed by: RADIOLOGY

## 2017-10-05 PROCEDURE — 40000863 ZZH STATISTIC RADIOLOGY XRAY, US, CT, MAR, NM

## 2017-10-05 PROCEDURE — 87076 CULTURE ANAEROBE IDENT EACH: CPT | Performed by: RADIOLOGY

## 2017-10-05 PROCEDURE — 82945 GLUCOSE OTHER FLUID: CPT | Performed by: RADIOLOGY

## 2017-10-05 PROCEDURE — 77003 FLUOROGUIDE FOR SPINE INJECT: CPT

## 2017-10-05 PROCEDURE — 25000125 ZZHC RX 250: Performed by: PSYCHIATRY & NEUROLOGY

## 2017-10-05 PROCEDURE — 87181 SC STD AGAR DILUTION PER AGT: CPT | Performed by: RADIOLOGY

## 2017-10-05 PROCEDURE — 87205 SMEAR GRAM STAIN: CPT | Performed by: RADIOLOGY

## 2017-10-05 PROCEDURE — 84157 ASSAY OF PROTEIN OTHER: CPT | Performed by: RADIOLOGY

## 2017-10-05 PROCEDURE — 87070 CULTURE OTHR SPECIMN AEROBIC: CPT | Performed by: RADIOLOGY

## 2017-10-05 RX ADMIN — LIDOCAINE HYDROCHLORIDE 4 ML: 10 INJECTION, SOLUTION EPIDURAL; INFILTRATION; INTRACAUDAL; PERINEURAL at 09:27

## 2017-10-05 NOTE — IP AVS SNAPSHOT
MRN:[id number]                      After Visit Summary   10/5/2017    Syd Renee    MRN: [id number]           Visit Information        Department      10/5/2017  8:13 AM [facility]          Review of your medicines      UNREVIEWED medicines. Ask your doctor about these medicines        Dose / Directions    ACETAZOLAMIDE PO        Dose:  1000 mg   Take 1,000 mg by mouth 2 times daily   Refills:  0       HYDROcodone-acetaminophen 5-325 MG per tablet   Commonly known as:  NORCO        Dose:  1-2 tablet   Take 1-2 tablets by mouth every 4 hours as needed for pain   Quantity:  15 tablet   Refills:  0       IBUPROFEN PO        Refills:  0       INDOMETHACIN PO        Dose:  50 mg   Take 50 mg by mouth daily as needed   Refills:  0       predniSONE 20 MG tablet   Commonly known as:  DELTASONE        Take two tablets (= 40mg) each day for 5 (five) days   Quantity:  10 tablet   Refills:  0       VERAPAMIL HCL PO        Dose:  120 mg   Take 120 mg by mouth every 12 hours   Refills:  0                Protect others around you: Learn how to safely use, store and throw away your medicines at www.disposemymeds.org.         Follow-ups after your visit        Your next 10 appointments already scheduled     Oct 12, 2017 12:00 PM CDT   (Arrive by 11:45 AM)   New Patient Visit with NAVEEN Miner [facility] ([facility])    [address] [place] [address] [phone]               Care Instructions        Further instructions from your care team       Lumbar Puncture Discharge Instructions     After you go home:      You may resume your normal diet    Continue to drink at least 8 ounces of fluid every 1-2 hours until bedtime tonight and continue to drink extra fluids for the next 2 days    Caffeinated beverages may help prevent or reduce spinal headaches    Care of Puncture Site:      If there is a bandaid  - you may remove it tomorrow morning    You may shower tomorrow    No tub baths, whirlpools or swimming for 48 hours     Activity - to help prevent spinal headache or spinal fluid leakage:      Minimize your activity today. Flat bedrest for 24 hrs is strongly suggested as this will help to prevent a spinal headache.  You can be up to the bathroom and for meals.    Resume normal activities tomorrow.     Avoid strenuous activity for the next 2 days    Do not drive a vehicle until tomorrow morning    Medicines:      You may resume all medications    Resume your Warfarin/Coumadin at your regular dose today. Follow up with your provider to have your INR rechecked    Resume your Platelet Inhibitors and Aspirin tomorrow at your regular dose    For minor pain, you may take Acetaminophen (Tylenol) or Ibuprofen (Advil)            Call the provider who ordered this procedure if:      Your headache becomes worse or is severe. (A minor headache is not unusual)    You have nausea or vomiting    The site is red, swollen, hot or tender    You have chills or a fever greater than 101 F (38 C)    Any questions or concerns    If you have questions call:        [facility] Rad[facility] Dept @ [phone]    The provider who performed your procedure was Dr. Jacobson.     Additional Information About Your Visit        [employer] Information     [employer] gives you secure access to your electronic health record. If you see a primary care provider, you can also send messages to your care team and make appointments. If you have questions, please call your primary care clinic.  If you do not have a primary care provider, please call [phone] and they will assist you.        Care EveryWhere ID     This is your Care EveryWhere ID. This could be used by other organizations to access your [facility] medical records  [id number]        Your Vitals Were     Blood Pressure Pulse Temperature Respirations Pulse Oximetry       115/69 (BP Location:  Right arm) 79 96.9  F (36.1  C) (Oral) 18 98%        Primary Care Provider Office Phone # Fax #    Kaur Grewal [phone]      Equal Access to Services     PATRICIA WHITLOCK : Hadii aad ku hadrosemaryo Soomaali, waaxda luqadaha, qaybta kaalmada adeegyada, gaurav parrishn keith guidosamantha milligan. So [facility] [phone].    ATENCIÓN: Si habla español, tiene a parr disposición servicios gratuitos de asistencia lingüística. Llame al [phone].    We comply with applicable federal civil rights laws and [place] laws. We do not discriminate on the basis of race, color, national origin, age, disability, sex, sexual orientation, or gender identity.            Thank you!     Thank you for choosing [facility] for your care. Our goal is always to provide you with excellent care. Hearing back from our patients is one way we can continue to improve our services. Please take a few minutes to complete the written survey that you may receive in the mail after you visit with us. Thank you!             Medication List: This is a list of all your medications and when to take them. Check marks below indicate your daily home schedule. Keep this list as a reference.      Medications           Morning Afternoon Evening Bedtime As Needed    ACETAZOLAMIDE PO   Take 1,000 mg by mouth 2 times daily                                HYDROcodone-acetaminophen 5-325 MG per tablet   Commonly known as:  NORCO   Take 1-2 tablets by mouth every 4 hours as needed for pain                                IBUPROFEN PO                                INDOMETHACIN PO   Take 50 mg by mouth daily as needed                                predniSONE 20 MG tablet   Commonly known as:  DELTASONE   Take two tablets (= 40mg) each day for 5 (five) days                                VERAPAMIL HCL PO   Take 120 mg by mouth every 12 hours

## 2017-10-05 NOTE — PROGRESS NOTES
Patient is here for LP.  States pain/tight stretching sensation in R shoulder is 4/10.  I showed him some stretches.  States understanding of discharge instructions.    0958  Patient returned to [facility], s/p lumbar puncture.  Low back LP site is WDL/no bleeding or hematoma.  On flat bedrest for one hour.  Denies headache.  His shoulder discomfort has lessened slightly.  Taking po fluids.    1100  Patient was discharged per W/C with .

## 2017-10-05 NOTE — IP AVS SNAPSHOT
[place] [address]    [place] [address]    Phone:  [phone]                                       After Visit Summary   10/5/2017    Syd Renee    MRN: [id number]           After Visit Summary Signature Page     I have received my discharge instructions, and my questions have been answered. I have discussed any challenges I see with this plan with the nurse or doctor.    ..........................................................................................................................................  Patient/Patient Representative Signature      ..........................................................................................................................................  Patient Representative Print Name and Relationship to Patient    ..................................................               ................................................  Date                                            Time    ..........................................................................................................................................  Reviewed by Signature/Title    ...................................................              ..............................................  Date                                                            Time

## 2017-10-06 ENCOUNTER — HOSPITAL ENCOUNTER (OUTPATIENT)
Dept: LAB | Facility: CLINIC | Age: 32
Discharge: HOME | End: 2017-10-06
Attending: PSYCHIATRY & NEUROLOGY | Admitting: PSYCHIATRY & NEUROLOGY
Payer: COMMERCIAL

## 2017-10-06 DIAGNOSIS — R11.0 NAUSEA: ICD-10-CM

## 2017-10-06 DIAGNOSIS — R51.9 FACIAL PAIN: ICD-10-CM

## 2017-10-06 DIAGNOSIS — G44.009 CLUSTER HEADACHE: Primary | ICD-10-CM

## 2017-10-06 DIAGNOSIS — G44.009 CLUSTER HEADACHE: ICD-10-CM

## 2017-10-06 LAB
ALBUMIN SERPL-MCNC: 4.5 G/DL (ref 3.4–5)
ALP SERPL-CCNC: 85 U/L (ref 40–150)
ALT SERPL W P-5'-P-CCNC: 28 U/L (ref 0–70)
ANION GAP SERPL CALCULATED.3IONS-SCNC: 9 MMOL/L (ref 3–14)
AST SERPL W P-5'-P-CCNC: 8 U/L (ref 0–45)
BILIRUB SERPL-MCNC: 0.8 MG/DL (ref 0.2–1.3)
BUN SERPL-MCNC: 11 MG/DL (ref 7–30)
CALCIUM SERPL-MCNC: 9.2 MG/DL (ref 8.5–10.1)
CHLORIDE SERPL-SCNC: 112 MMOL/L (ref 94–109)
CO2 SERPL-SCNC: 21 MMOL/L (ref 20–32)
CREAT SERPL-MCNC: 1.1 MG/DL (ref 0.66–1.25)
ERYTHROCYTE [DISTWIDTH] IN BLOOD BY AUTOMATED COUNT: 13.9 % (ref 10–15)
GFR SERPL CREATININE-BSD FRML MDRD: 78 ML/MIN/1.7M2
GLUCOSE SERPL-MCNC: 96 MG/DL (ref 70–99)
HCT VFR BLD AUTO: 46.9 % (ref 40–53)
HGB BLD-MCNC: 16.6 G/DL (ref 13.3–17.7)
MCH RBC QN AUTO: 29 PG (ref 26.5–33)
MCHC RBC AUTO-ENTMCNC: 35.4 G/DL (ref 31.5–36.5)
MCV RBC AUTO: 82 FL (ref 78–100)
PLATELET # BLD AUTO: 587 10E9/L (ref 150–450)
POTASSIUM SERPL-SCNC: 3.6 MMOL/L (ref 3.4–5.3)
PROT SERPL-MCNC: 7.6 G/DL (ref 6.8–8.8)
RBC # BLD AUTO: 5.72 10E12/L (ref 4.4–5.9)
SODIUM SERPL-SCNC: 142 MMOL/L (ref 133–144)
WBC # BLD AUTO: 16.3 10E9/L (ref 4–11)

## 2017-10-06 PROCEDURE — 85027 COMPLETE CBC AUTOMATED: CPT | Performed by: PSYCHIATRY & NEUROLOGY

## 2017-10-06 PROCEDURE — 80053 COMPREHEN METABOLIC PANEL: CPT | Performed by: PSYCHIATRY & NEUROLOGY

## 2017-10-06 PROCEDURE — 36415 COLL VENOUS BLD VENIPUNCTURE: CPT | Performed by: PSYCHIATRY & NEUROLOGY

## 2017-10-13 LAB
BACTERIA SPEC CULT: ABNORMAL
BACTERIA SPEC CULT: ABNORMAL
Lab: ABNORMAL
SPECIMEN SOURCE: ABNORMAL

## 2018-03-07 DIAGNOSIS — R00.2 PALPITATIONS: Primary | ICD-10-CM

## 2018-03-09 ENCOUNTER — HOSPITAL ENCOUNTER (OUTPATIENT)
Dept: CARDIOLOGY | Facility: CLINIC | Age: 33
Discharge: HOME | End: 2018-03-09
Attending: PSYCHIATRY & NEUROLOGY | Admitting: PSYCHIATRY & NEUROLOGY

## 2018-03-09 DIAGNOSIS — R00.2 PALPITATIONS: ICD-10-CM

## 2018-03-09 PROCEDURE — 93005 ELECTROCARDIOGRAM TRACING: CPT

## 2018-03-09 PROCEDURE — 93010 ELECTROCARDIOGRAM REPORT: CPT | Performed by: INTERNAL MEDICINE

## 2018-03-14 LAB — INTERPRETATION ECG - MUSE: NORMAL

## 2020-01-16 NOTE — ED AVS SNAPSHOT
Emergency Department    [address] [facility] [address]    Phone:  [phone]    Fax:  [phone]                                       Syd Renee   MRN: [id number]    Department:   Emergency Department   Date of Visit:  6/21/2017           Patient Information     Date Of Birth          1985        Your diagnoses for this visit were:     Other migraine without status migrainosus, not intractable        You were seen by Teresa Carson MD.      Follow-up Information     Schedule an appointment as soon as possible for a visit with Kaur Grewal MD.    Specialty:  Neurology    Contact information:    [facility]  [address] [place] [address] [place] [id number] [phone]          Discharge Instructions       Home, rest, try an ice pack to the head.  Recheck in the clinic this week with the neurologist this week or early next week.  If you get worse, return to the ER.        Migraines and Cluster Headaches  Migraines and cluster headaches cause intense, throbbing pain on one side of the head. With a migraine, you may have nausea and vomiting and be sensitive to light and sound. You may also have warning signs, such as flashing lights or loss of parts of your vision, before the pain starts. Migraines are three times more common in women than men. This may be due to hormonal changes during menstruation. Typical migrains may last for 4 to 72 hours untreated.  Cluster headaches recur in groups for days, weeks, or months. The pain is centered around or behind one eye. The eye may also become red or teary, or the eyelid may droop. Migraines and cluster headaches can have many triggers.    Preventing migraines and cluster headaches  Try the following steps:    Avoid aged cheeses, nuts, beans, chocolate, red wine, or foods that contain caffeine, alcohol, tobacco, nitrates, and MSG.    Try not to skip meals.    Don t work in poor lighting.    Reduce stress as much as you  Chief Complaint   Patient presents with    Ear Pain     did receive medication for his ear pain how ever he cannot hear out of the right ear.  states that now there is discharge and wants it to be looked at can.    Get plenty of sleep each night.    Exercise regularly under your doctor s guidance.    Avoid taking headache medicines for more than 3 days, because of the risk of rebound headaches.  Relieving the pain  Try these suggestions:    Stay quiet and rest.    Use cold to numb the pain. Wrap ice or a cold can of soda in a cloth. Hold it against the site of pain for 10 minutes. Repeat every 20 minutes.    Avoid light. Wear dark glasses, turn out lights, and close the curtains. When outdoors, wear a brimmed hat.    Drink lots of fluids. Sip caffeine-free flat soda to help relieve nausea.    See your doctor if you get migraines or cluster headaches often. There are effective medications to help treat or prevent them.    Hormone therapy. This may help women whose migraines are related to hormonal changes during menstruation.  Date Last Reviewed: 10/19/2015    9148-2773 [employer]. [address] [place] [address]. All rights reserved. This information is not intended as a substitute for professional medical care. Always follow your healthcare professional's instructions.          Future Appointments        Provider Department Dept Phone Center    6/23/2017 1:00 PM Maury Xie MD [facility] [phone]       24 Hour Appointment Hotline       To make an appointment at any [facility], call [phone] ([phone]). If you don't have a family doctor or clinic, we will help you find one. [facility] are conveniently located to serve the needs of you and your family.             Review of your medicines      Our records show that you are taking the medicines listed below. If these are incorrect, please call your family doctor or clinic.        Dose / Directions Last dose taken    cefuroxime 250 MG/5ML suspension   Commonly known as:  CEFTIN   Dose:  500 mg        Take 500 mg by mouth 2 times daily   Refills:  0        HYDROcodone-acetaminophen 5-325 MG per  tablet   Commonly known as:  NORCO   Dose:  1-2 tablet   Quantity:  15 tablet        Take 1-2 tablets by mouth every 4 hours as needed for pain   Refills:  0        IBUPROFEN PO        Refills:  0        promethazine 25 MG tablet   Commonly known as:  PHENERGAN   Dose:  12.5 mg   Quantity:  10 tablet        Take 0.5 tablets (12.5 mg) by mouth every 6 hours as needed for nausea or other (headache)   Refills:  1                Procedures and tests performed during your visit     Peripheral IV: Standard    Vital signs      Orders Needing Specimen Collection     None      Pending Results     No orders found from 6/19/2017 to 6/22/2017.            Pending Culture Results     No orders found from 6/19/2017 to 6/22/2017.            Pending Results Instructions     If you had any lab results that were not finalized at the time of your Discharge, you can call the ED Lab Result RN at [phone]. You will be contacted by this team for any positive Lab results or changes in treatment. The nurses are available 7 days a week from 10A to 6:30P.  You can leave a message 24 hours per day and they will return your call.        Test Results From Your Hospital Stay               Clinical Quality Measure: Blood Pressure Screening     Your blood pressure was checked while you were in the emergency department today. The last reading we obtained was  BP: (!) 146/98 . Please read the guidelines below about what these numbers mean and what you should do about them.  If your systolic blood pressure (the top number) is less than 120 and your diastolic blood pressure (the bottom number) is less than 80, then your blood pressure is normal. There is nothing more that you need to do about it.  If your systolic blood pressure (the top number) is 120-139 or your diastolic blood pressure (the bottom number) is 80-89, your blood pressure may be higher than it should be. You should have your blood pressure rechecked within a year by a primary care  "provider.  If your systolic blood pressure (the top number) is 140 or greater or your diastolic blood pressure (the bottom number) is 90 or greater, you may have high blood pressure. High blood pressure is treatable, but if left untreated over time it can put you at risk for heart attack, stroke, or kidney failure. You should have your blood pressure rechecked by a primary care provider within the next 4 weeks.  If your provider in the emergency department today gave you specific instructions to follow-up with your doctor or provider even sooner than that, you should follow that instruction and not wait for up to 4 weeks for your follow-up visit.        Thank you for choosing [facility]       Thank you for choosing [facility] for your care. Our goal is always to provide you with excellent care. Hearing back from our patients is one way we can continue to improve our services. Please take a few minutes to complete the written survey that you may receive in the mail after you visit with us. Thank you!        [employer] Information     [facility] lets you send messages to your doctor, view your test results, renew your prescriptions, schedule appointments and more. To sign up, go to [email or web address] . Click on \"Log in\" on the left side of the screen, which will take you to the Welcome page. Then click on \"Sign up Now\" on the right side of the page.     You will be asked to enter the access code listed below, as well as some personal information. Please follow the directions to create your username and password.     Your access code is: [email or web address]  Expires: 2017  4:15 PM     Your access code will  in 90 days. If you need help or a new code, please call your [facility] clinic or [phone].        Care EveryWhere ID     This is your Care EveryWhere ID. This could be used by other organizations to access your [facility] medical records  [id number]        Equal Access to Services     PATRICIA BRADFORD: Yenny bender " sunil Weaver, akil rapp, emilio sinclairmaso doshi, gaurav milligan. So [facility] [phone].    ATENCIÓN: Si habla español, tiene a parr disposición servicios gratuitos de asistencia lingüística. Llame al [phone].    We comply with applicable federal civil rights laws and [place] laws. We do not discriminate on the basis of race, color, national origin, age, disability sex, sexual orientation or gender identity.            After Visit Summary       This is your record. Keep this with you and show to your community pharmacist(s) and doctor(s) at your next visit.

## 2020-03-10 ENCOUNTER — HEALTH MAINTENANCE LETTER (OUTPATIENT)
Age: 35
End: 2020-03-10

## 2020-12-20 ENCOUNTER — HEALTH MAINTENANCE LETTER (OUTPATIENT)
Age: 35
End: 2020-12-20

## 2021-04-24 ENCOUNTER — HEALTH MAINTENANCE LETTER (OUTPATIENT)
Age: 36
End: 2021-04-24

## 2021-10-03 ENCOUNTER — HEALTH MAINTENANCE LETTER (OUTPATIENT)
Age: 36
End: 2021-10-03

## 2022-05-15 ENCOUNTER — HEALTH MAINTENANCE LETTER (OUTPATIENT)
Age: 37
End: 2022-05-15

## 2022-09-11 ENCOUNTER — HEALTH MAINTENANCE LETTER (OUTPATIENT)
Age: 37
End: 2022-09-11

## 2023-06-03 ENCOUNTER — HEALTH MAINTENANCE LETTER (OUTPATIENT)
Age: 38
End: 2023-06-03

## 2024-04-25 NOTE — DISCHARGE INSTRUCTIONS
Lumbar Puncture Discharge Instructions     After you go home:      You may resume your normal diet    Continue to drink at least 8 ounces of fluid every 1-2 hours until bedtime tonight and continue to drink extra fluids for the next 2 days    Caffeinated beverages may help prevent or reduce spinal headaches    Care of Puncture Site:      If there is a bandaid - you may remove it tomorrow morning    You may shower tomorrow    No tub baths, whirlpools or swimming for 48 hours     Activity - to help prevent spinal headache or spinal fluid leakage:      Minimize your activity today. Flat bedrest for 24 hrs is strongly suggested as this will help to prevent a spinal headache.  You can be up to the bathroom and for meals.    Resume normal activities tomorrow.     Avoid strenuous activity for the next 2 days    Do not drive a vehicle until tomorrow morning    Medicines:      You may resume all medications    Resume your Warfarin/Coumadin at your regular dose today. Follow up with your provider to have your INR rechecked    Resume your Platelet Inhibitors and Aspirin tomorrow at your regular dose    For minor pain, you may take Acetaminophen (Tylenol) or Ibuprofen (Advil)            Call the provider who ordered this procedure if:      Your headache becomes worse or is severe. (A minor headache is not unusual)    You have nausea or vomiting    The site is red, swollen, hot or tender    You have chills or a fever greater than 101 F (38 C)    Any questions or concerns    If you have questions call:        [facility] Radiology Dept @ [phone]    The provider who performed your procedure was Dr. Jacobson.   Detail Level: Zone Render In Strict Bullet Format?: No Discontinue Regimen: Triamcinolone Initiate Treatment: ketoconazole 2 % topical cream, Apply to the eyebrows and ears nightly X 1 week, then 1-2 X a week for maintenance. Continue Regimen: Augmentin given by urgent care Initiate Treatment: mupirocin 2 % topical ointment, Apply to affected area of the left neck  BID  for 2 weeks or until healed.

## (undated) RX ORDER — LIDOCAINE HYDROCHLORIDE 10 MG/ML
INJECTION, SOLUTION EPIDURAL; INFILTRATION; INTRACAUDAL; PERINEURAL
Status: DISPENSED
Start: 2017-10-05